# Patient Record
Sex: FEMALE | Race: WHITE | NOT HISPANIC OR LATINO | Employment: OTHER | URBAN - NONMETROPOLITAN AREA
[De-identification: names, ages, dates, MRNs, and addresses within clinical notes are randomized per-mention and may not be internally consistent; named-entity substitution may affect disease eponyms.]

---

## 2017-01-04 ENCOUNTER — OFFICE VISIT - GICH (OUTPATIENT)
Dept: FAMILY MEDICINE | Facility: OTHER | Age: 47
End: 2017-01-04

## 2017-01-04 DIAGNOSIS — B37.2 CANDIDIASIS OF SKIN AND NAIL: ICD-10-CM

## 2017-01-04 DIAGNOSIS — Z00.00 ENCOUNTER FOR GENERAL ADULT MEDICAL EXAMINATION WITHOUT ABNORMAL FINDINGS: ICD-10-CM

## 2017-01-04 DIAGNOSIS — R30.0 DYSURIA: ICD-10-CM

## 2017-01-04 LAB
BILIRUB UR QL: NEGATIVE
CHOL/HDL RATIO - HISTORICAL: 5.64
CHOLESTEROL TOTAL: 282 MG/DL
CLARITY, URINE: CLEAR CLARITY
COLOR UR: YELLOW COLOR
GLUCOSE URINE: NEGATIVE MG/DL
HDLC SERPL-MCNC: 50 MG/DL (ref 23–92)
KETONES UR QL: NEGATIVE MG/DL
LDLC SERPL CALC-MCNC: 206 MG/DL
LEUKOCYTE ESTERASE URINE: NEGATIVE
NITRITE UR QL STRIP: NEGATIVE
NON-HDL CHOLESTEROL - HISTORICAL: 232 MG/DL
OCCULT BLOOD,URINE - HISTORICAL: NEGATIVE
PATIENT STATUS - HISTORICAL: ABNORMAL
PH UR: 7 [PH]
PROTEIN QUALITATIVE,URINE - HISTORICAL: NEGATIVE MG/DL
SP GR UR STRIP: 1.01
TRIGL SERPL-MCNC: 131 MG/DL
UROBILINOGEN,QUALITATIVE - HISTORICAL: NORMAL EU/DL

## 2017-01-04 ASSESSMENT — PATIENT HEALTH QUESTIONNAIRE - PHQ9: SUM OF ALL RESPONSES TO PHQ QUESTIONS 1-9: 1

## 2017-01-17 LAB — HPV RESULTS - HISTORICAL: NEGATIVE

## 2017-01-20 ENCOUNTER — HISTORY (OUTPATIENT)
Dept: FAMILY MEDICINE | Facility: OTHER | Age: 47
End: 2017-01-20

## 2017-01-20 ENCOUNTER — OFFICE VISIT - GICH (OUTPATIENT)
Dept: FAMILY MEDICINE | Facility: OTHER | Age: 47
End: 2017-01-20

## 2017-01-20 DIAGNOSIS — J06.9 ACUTE UPPER RESPIRATORY INFECTION: ICD-10-CM

## 2017-01-20 DIAGNOSIS — B97.89 OTHER VIRAL AGENTS AS THE CAUSE OF DISEASES CLASSIFIED ELSEWHERE: ICD-10-CM

## 2017-01-20 DIAGNOSIS — J02.9 ACUTE PHARYNGITIS: ICD-10-CM

## 2017-01-20 LAB — STREP A ANTIGEN - HISTORICAL: NEGATIVE

## 2017-01-31 ENCOUNTER — HISTORY (OUTPATIENT)
Dept: FAMILY MEDICINE | Facility: OTHER | Age: 47
End: 2017-01-31

## 2017-01-31 ENCOUNTER — OFFICE VISIT - GICH (OUTPATIENT)
Dept: FAMILY MEDICINE | Facility: OTHER | Age: 47
End: 2017-01-31

## 2017-01-31 DIAGNOSIS — J01.00 ACUTE MAXILLARY SINUSITIS: ICD-10-CM

## 2017-02-15 ENCOUNTER — HISTORY (OUTPATIENT)
Dept: FAMILY MEDICINE | Facility: OTHER | Age: 47
End: 2017-02-15

## 2017-02-15 ENCOUNTER — OFFICE VISIT - GICH (OUTPATIENT)
Dept: FAMILY MEDICINE | Facility: OTHER | Age: 47
End: 2017-02-15

## 2017-02-15 DIAGNOSIS — J45.991 COUGH VARIANT ASTHMA: ICD-10-CM

## 2017-02-15 DIAGNOSIS — J01.01 ACUTE RECURRENT MAXILLARY SINUSITIS: ICD-10-CM

## 2017-03-17 ENCOUNTER — HOSPITAL ENCOUNTER (OUTPATIENT)
Dept: RADIOLOGY | Facility: OTHER | Age: 47
End: 2017-03-17
Attending: FAMILY MEDICINE

## 2017-03-17 ENCOUNTER — HISTORY (OUTPATIENT)
Dept: RADIOLOGY | Facility: OTHER | Age: 47
End: 2017-03-17

## 2017-03-17 DIAGNOSIS — Z12.31 ENCOUNTER FOR SCREENING MAMMOGRAM FOR MALIGNANT NEOPLASM OF BREAST: ICD-10-CM

## 2017-05-12 ENCOUNTER — OFFICE VISIT - GICH (OUTPATIENT)
Dept: FAMILY MEDICINE | Facility: OTHER | Age: 47
End: 2017-05-12

## 2017-05-12 ENCOUNTER — HISTORY (OUTPATIENT)
Dept: FAMILY MEDICINE | Facility: OTHER | Age: 47
End: 2017-05-12

## 2017-05-12 ENCOUNTER — AMBULATORY - GICH (OUTPATIENT)
Dept: SCHEDULING | Facility: OTHER | Age: 47
End: 2017-05-12

## 2017-05-12 DIAGNOSIS — Z80.0 FAMILY HISTORY OF MALIGNANT NEOPLASM OF DIGESTIVE ORGAN: ICD-10-CM

## 2017-05-12 DIAGNOSIS — G44.219 EPISODIC TENSION-TYPE HEADACHE, NOT INTRACTABLE: ICD-10-CM

## 2017-05-12 DIAGNOSIS — M54.50 LOW BACK PAIN: ICD-10-CM

## 2017-05-12 DIAGNOSIS — L73.9 FOLLICULAR DISORDER: ICD-10-CM

## 2017-05-12 DIAGNOSIS — G89.29 OTHER CHRONIC PAIN: ICD-10-CM

## 2017-08-12 ENCOUNTER — HISTORY (OUTPATIENT)
Dept: EMERGENCY MEDICINE | Facility: OTHER | Age: 47
End: 2017-08-12

## 2017-09-13 ENCOUNTER — AMBULATORY - GICH (OUTPATIENT)
Dept: FAMILY MEDICINE | Facility: OTHER | Age: 47
End: 2017-09-13

## 2017-12-08 ENCOUNTER — OFFICE VISIT - GICH (OUTPATIENT)
Dept: FAMILY MEDICINE | Facility: OTHER | Age: 47
End: 2017-12-08

## 2017-12-08 ENCOUNTER — HISTORY (OUTPATIENT)
Dept: FAMILY MEDICINE | Facility: OTHER | Age: 47
End: 2017-12-08

## 2017-12-08 DIAGNOSIS — M54.50 LOW BACK PAIN: ICD-10-CM

## 2017-12-08 DIAGNOSIS — G89.29 OTHER CHRONIC PAIN: ICD-10-CM

## 2017-12-08 DIAGNOSIS — G44.219 EPISODIC TENSION-TYPE HEADACHE, NOT INTRACTABLE: ICD-10-CM

## 2018-01-02 NOTE — NURSING NOTE
Patient Information     Patient Name MRN Mary Craft [id number] Female 1970      Nursing Note by Marlyn Daley at 2017 11:00 AM     Author:  Marlyn Daley Service:  (none) Author Type:  (none)     Filed:  2017 11:47 AM Encounter Date:  2017 Status:  Signed     :  Marlyn Daley            Patient here for yearly physical.  Marlyn Daley LPN ..........2017 11:06 AM

## 2018-01-02 NOTE — PROGRESS NOTES
Patient Information     Patient Name MRN Sex Mary Jackson [id number] Female 1970      Progress Notes by Jemima Mendez MD at 2017  5:54 PM     Author:  Jemima Mendez MD Service:  (none) Author Type:  Physician     Filed:  2017  5:54 PM Encounter Date:  2017 Status:  Signed     :  Jemima Mendez MD (Physician)            Notified of results via [employer].

## 2018-01-02 NOTE — PROGRESS NOTES
Patient Information     Patient Name MRN Sex Mary Jackson [id number] Female 1970      Progress Notes by Jemima Mendez MD at 2017 12:32 PM     Author:  Jemima Mendez MD Service:  (none) Author Type:  Physician     Filed:  2017 12:32 PM Encounter Date:  2017 Status:  Signed     :  Jemima Mendez MD (Physician)            Notified of results via [employer].

## 2018-01-02 NOTE — PROGRESS NOTES
Patient Information     Patient Name MRN Sex     Mary Gutierrez [id number] Female 1970      Progress Notes by Jemima Mendez MD at 2017 11:00 AM     Author:  Jemima Mendez MD Service:  (none) Author Type:  Physician     Filed:  2017  6:47 PM Encounter Date:  2017 Status:  Signed     :  Jemima Mendez MD (Physician)            ANNUAL PHYSICAL - FEMALE    HPI: Mary Gutierrez is a 46 y.o. female who presents for a yearly exam.  Concerns include: Upcoming travel to the [facility] [place].    Patient noted 2 days of dysuria and bladder spasms several weeks ago. Symptoms resolved but then recurred shortly after for another 2 days. No gross hematuria. No fevers. Currently she has no symptoms.    No LMP recorded. Patient has had an implant.   Contraception: Mirena IUD  Risk for STI?: no  Last pap:   Any hx of abnormal paps:  no  FH of early CA?: no  Tobacco?: no  Calcium intake: yes  DEXA:  not indicated   Last mammo: 2016  Colonoscopy:  not indicated   Immunizations: Flu shot and hepatitis A shot today.    Patient Active Problem List       Diagnosis  Date Noted     SHOULDER IMPINGEMENT SYNDROME, RIGHT  2011     EPISODIC TENSION TYPE HEADACHE       PRONATION FOOT OR ANKLE, ACQUIRED       Encounter for IUD removal       HYPERCHOLESTEROLEMIA       ASTHMA       allergy induced            Past Medical History      Diagnosis   Date     EARLENE POSITIVE       Discovered during infertility work up        History of delivery        1 para 1-0-0-2 - twin gestation, status post IVF       In vitro fertilization  2004     IUD  05     Mirena IUD placed replaced 2010      Parvovirus B19  2012     confirmed with antibody; complicated by edema, lft increase, bp increase and  brief renal worsening; resolved by          Past Surgical History       Procedure   Laterality Date     Oral surgery        [place] teeth       Invitro        IVF        section         section -  breech/breech twins       Colonoscopy   2013     [place], normal         Social History     Social History        Marital status:       Spouse name: N/A     Number of children:  N/A     Years of education:  N/A     Occupational History      Not on file.     Social History Main Topics         Smoking status:   Never Smoker     Smokeless tobacco:   Never Used     Alcohol use   Yes      Comment: 1-2 glasses of wine in a week      Drug use:   Not on file     Sexual activity:   Not on file     Other Topics  Concern     Not on file      Social History Narrative     She is  with two children. Presently at home with twin daughters.               Family History       Problem   Relation Age of Onset     Other  Maternal Grandmother      Had two spontaneous losses       Cancer-ovarian  Maternal Grandmother 82     Cancer  Maternal Grandfather      Had rectal cancer       Diabetes  Other      Diabetes on maternal side.       Hyperlipidemia  Mother      and CLL dx in 50s       Cancer-colon  Father 60     colon and liver cancer         Current Outpatient Prescriptions       Medication  Sig Dispense Refill     calcium once daily.       cholecalciferol (VITAMIN D) 1,000 unit capsule Take 1 capsule by mouth once daily.  0     cyclobenzaprine (FLEXERIL) 10 mg tablet Take 1 tablet by mouth 3 times daily. 90 tablet 3     HYDROcodone-acetaminophen, 5-325 mg, (NORCO) per tablet Take 1-2 tablets by mouth every 6 hours if needed for Pain. Max acetaminophen dose: 4000 mg in 24 hrs. 60 tablet 0     Lactobacillus acidophilus (PROBIOTIC) 10 billion cell cap Take  by mouth.  0     multivitamin (MVI) tablet Take 1 tablet by mouth once daily.       omega-3 fatty acids-vitamin E (FISH OIL) 1,000 mg cap Take  by mouth once daily.       No current facility-administered medications for this visit.      Medications have been reviewed by me and are current to the best of my knowledge and ability.       REVIEW OF SYSTEMS:  15 system ROS  "completed and negative other than: See HPI.    PHYSICAL EXAM:  Visit Vitals       /76     Pulse 60     Ht 1.613 m (5' 3.5\")     Wt 61.1 kg (134 lb 9.6 oz)     BMI 23.47 kg/m2     CONSTITUTIONAL:  Alert, cooperative, NAD.  EYES: No scleral icterus.  PERRLA.  Conjunctiva clear.  ENT/MOUTH: External ears and nose normal.  TMs normal.  Moist mucous membranes. Oropharynx clear.    ENDO: No thyromegaly or thyroid nodules.  LYMPH:  No cervical or supraclavicular LA.    BREASTS: No skin abnormalities, no erythema.  No discrete masses.  No nipple discharge, no axillary, supra- or infraclavicular LA.   CARDIOVASCULAR: Regular, S1, S2.  No S3 or S4.  No murmur/gallop/rub.  No peripheral edema.  RESPIRATORY: CTA bilaterally, no wheezes, rhonchi or rales.  GI: Bowel sounds wnl.  Soft, nontender, nondistended.  No masses or HSM.  No rebound or guarding.  : Vulva: normal, no lesions or discharge  Urethral meatus: normal size and location, no lesions or discharge  Urethra: no tenderness or masses  Bladder: no fullness or tenderness  Vagina: normal appearance, no abnormal discharge, no lesions.  No evidence of cystocele or rectocele. Rash on left side of vulva red with sharp demarcation of the outer aspect, evidence of excoriation.  Cervix: normal appearance, no lesions, no abnormal discharge, no cervical motion tenderness  Uterus: normal size and position, mobile, non-tender  Pap smear obtained: yes  Adnexa: no palpable masses bilaterally  MSKEL: Grossly normal ROM.  No clubbing.  INTEGUMENTARY:  Warm, dry.  No rash noted on exposed skin.  NEUROLOGIC: Facies symmetric.  Grossly normal movement and tone.  No tremor.  PSYCHIATRIC: Affect normal.  Speech fluent.  Though content linear.     ASSESSMENT/PLAN:    ICD-10-CM    1. Health care maintenance Z00.00 GYN THIN PREP PAP SCREEN IMAGED      LIPID PANEL      FLU VACCINE => 3 PRESERV FREE QUADRIVALENT IIV4 IM      HEP A VACCINE ADULT IM      LIPID PANEL      GYN THIN PREP PAP " SCREEN IMAGED      NV ADMIN VACC INITIAL      NV ADMIN EA ADDL VACC   2. Dysuria R30.0 URINALYSIS W REFLEX MICROSCOPIC IF POSITIVE      URINALYSIS W REFLEX MICROSCOPIC IF POSITIVE      URINALYSIS W REFLEX MICROSCOPIC IF POSITIVE   3. Yeast dermatitis B37.2 fluconazole (DIFLUCAN) 150 mg tablet       UA today. If negative will place an order for repeat UA when symptoms recur. Discussed possibility of a stone causing symptoms. Would recommend continued monitoring.  Hep A today with repeat in 6 months.  Reviewed CDC recommendations for travel to [place] and recommend review of the information available to prevent traveler's diarrhea.  Discussed probable yeast infection of the vulva, would recommend treatment with Diflucan consider topical antifungal.  Due to previous elevated lipid profile cardiac risk factor calculator is reviewed with the patient. Her risk of a heart attack in the next 10 years is 1.4% which would not indicate a need for high-dose statin therapy. Repeat fasting lipid profile today. Discussed lifestyle changes that may help reduce cholesterol.  Relevant cancer screening discussed.    Counseled on healthy diet, Calcium and vitamin D intake, and exercise.    Jemima Mendez MD

## 2018-01-03 NOTE — PROGRESS NOTES
Patient Information     Patient Name MRN Sex Mary Jackson [id number] Female 1970      Progress Notes by Pau Kent DO at 2017 11:30 AM     Author:  Pau Kent DO Service:  (none) Author Type:  PHYS- Osteopathic     Filed:  2017  1:44 PM Encounter Date:  2017 Status:  Signed     :  Pau Kent DO (PHYS- Osteopathic)            SUBJECTIVE:    Mary Gutierrez is a 46 y.o. female who presents for ill x 2 weeks.    HPI  Mary is here for concerns of illness ×2 weeks. She was initially seen a week and a half ago for concerns of strep throat, however tested negative.  Since that time she feels like the illness has now gotten into her chest, she is coughing more, has strained back muscles due to her cough. She also has significant tenderness in her left ear. Feels feverish and chilled but does not believe she had an objective fever.  She has tried using essential oils, vitamin C, Echinacea. She has also tried more conventional means of Mucinex and nasal saline.    Allergies     Allergen  Reactions     Sulfa (Sulfonamide Antibiotics) Nausea Only   ,   Current Outpatient Prescriptions on File Prior to Visit       Medication  Sig Dispense Refill     calcium once daily.       cholecalciferol (VITAMIN D) 1,000 unit capsule Take 1 capsule by mouth once daily.  0     cyclobenzaprine (FLEXERIL) 10 mg tablet Take 1 tablet by mouth 3 times daily. 90 tablet 3     fluconazole (DIFLUCAN) 150 mg tablet Take 1 tablet by mouth once daily. 3 tablet 0     HYDROcodone-acetaminophen, 5-325 mg, (NORCO) per tablet Take 1-2 tablets by mouth every 6 hours if needed for Pain. Max acetaminophen dose: 4000 mg in 24 hrs. 60 tablet 0     Lactobacillus acidophilus (PROBIOTIC) 10 billion cell cap Take  by mouth.  0     multivitamin (MVI) tablet Take 1 tablet by mouth once daily.       omega-3 fatty acids-vitamin E (FISH OIL) 1,000 mg cap Take  by mouth once daily.       No current  facility-administered medications on file prior to visit.     and   Past Medical History      Diagnosis   Date     EARLENE POSITIVE       Discovered during infertility work up        History of delivery        1 para 1-0-0-2 - twin gestation, status post IVF       In vitro fertilization       IUD  05     Mirena IUD placed replaced 2010      Parvovirus B19  2012     confirmed with antibody; complicated by edema, lft increase, bp increase and  brief renal worsening; resolved by          REVIEW OF SYSTEMS:  Review of Systems   Respiratory: Positive for sputum production. Negative for hemoptysis, shortness of breath and wheezing.    Cardiovascular: Negative for chest pain and palpitations.   Gastrointestinal: Negative for nausea and vomiting.       OBJECTIVE:  /80  Pulse 100  Temp 97.1  F (36.2  C) (Tympanic)  Wt 61.6 kg (135 lb 12.8 oz)  BMI 23.6 kg/m2    EXAM:   Physical Exam   Constitutional: She is well-developed, well-nourished, and in no distress.   HENT:   Head: Normocephalic and atraumatic.   Right Ear: External ear and ear canal normal. A middle ear effusion is present.   Left Ear: External ear normal. Tympanic membrane is erythematous and bulging. A middle ear effusion is present.   Nose: Mucosal edema and rhinorrhea present. Right sinus exhibits maxillary sinus tenderness. Right sinus exhibits no frontal sinus tenderness. Left sinus exhibits no maxillary sinus tenderness and no frontal sinus tenderness.   Mouth/Throat: Uvula is midline and oropharynx is clear and moist. No oropharyngeal exudate.   Cardiovascular: Normal rate, normal heart sounds and intact distal pulses.    No murmur heard.  Pulmonary/Chest: Effort normal and breath sounds normal.   Psychiatric: Mood and affect normal.   Nursing note and vitals reviewed.      ASSESSMENT/PLAN:    ICD-10-CM    1. Subacute maxillary sinusitis J01.00 cefdinir (OMNICEF) 300 mg capsule        Plan:   Sinusitis, persistent.  Treat with  14 days of omnicef BID.  Encouraged probiotic/yogurt while on medication.  Discussed other symptomatic treatment. Recommend NSAIDs, decongestent and saline irrigation. Follow-up if worsening sx or no improvement in the next 7-10 days.

## 2018-01-03 NOTE — NURSING NOTE
Patient Information     Patient Name MRN Mary Craft [id number] Female 1970      Nursing Note by Xiomara Solitario at 2/15/2017 10:30 AM     Author:  Xiomara Solitario Service:  (none) Author Type:  (none)     Filed:  2/15/2017 10:49 AM Encounter Date:  2/15/2017 Status:  Signed     :  Xiomara Solitario            She has had a cold for 4 weeks. She has had a cough for 3 weeks. It was productive to start out with but now it is harsh and she is coughing harder. Her left ear was plugged but is better now. She was treated for a sinus infection 2.5 weeks ago and that is better.  Xiomara Soiltario LPN..................2/15/2017   10:45 AM

## 2018-01-03 NOTE — PROGRESS NOTES
Patient Information     Patient Name MRN Sex Mary Jackson [id number] Female 1970      Progress Notes by Jennifer Huffman PA-C at 2017  8:30 AM     Author:  Jennifer Huffman PA-C Service:  (none) Author Type:  PHYS- Physician Assistant     Filed:  2017  9:38 AM Encounter Date:  2017 Status:  Signed     :  Jennifer Huffman PA-C (PHYS- Physician Assistant)            Nursing Notes:   Matt Nevarez  2017  8:53 AM  Signed  Pt here today for a sore throat that started yesterday, sinuses are draining into the back of her throat, pt stated its pain full to swallow.  Matt Nevarez LPN .............2017  8:36 AM      HPI:    Mary Gutierrez is a 46 y.o. female who presents for sore throat that started yesterday. Sinuses are draining into the back of her throat, pt stated its pain full to swallow. Strep exposure with school. No fevers, cough, GI symptoms. No ear pain. LNs swollen.  PND.     Past Medical History      Diagnosis   Date     EARLENE POSITIVE       Discovered during infertility work up        History of delivery        1 para 1-0-0-2 - twin gestation, status post IVF       In vitro fertilization       IUD  05     Mirena IUD placed replaced 2010      Parvovirus B19  2012     confirmed with antibody; complicated by edema, lft increase, bp increase and  brief renal worsening; resolved by          Past Surgical History       Procedure   Laterality Date     Oral surgery        [place] teeth       Invitro        IVF        section         section - breech/breech twins       Colonoscopy        [place], normal         Social History       Substance Use Topics         Smoking status:   Never Smoker     Smokeless tobacco:   Never Used     Alcohol use   Yes      Comment: 1-2 glasses of wine in a week        Current Outpatient Prescriptions       Medication  Sig Dispense Refill     calcium once daily.       cholecalciferol (VITAMIN D)  "1,000 unit capsule Take 1 capsule by mouth once daily.  0     cyclobenzaprine (FLEXERIL) 10 mg tablet Take 1 tablet by mouth 3 times daily. 90 tablet 3     fluconazole (DIFLUCAN) 150 mg tablet Take 1 tablet by mouth once daily. 3 tablet 0     HYDROcodone-acetaminophen, 5-325 mg, (NORCO) per tablet Take 1-2 tablets by mouth every 6 hours if needed for Pain. Max acetaminophen dose: 4000 mg in 24 hrs. 60 tablet 0     Lactobacillus acidophilus (PROBIOTIC) 10 billion cell cap Take  by mouth.  0     multivitamin (MVI) tablet Take 1 tablet by mouth once daily.       omega-3 fatty acids-vitamin E (FISH OIL) 1,000 mg cap Take  by mouth once daily.       No current facility-administered medications for this visit.      Medications have been reviewed by me and are current to the best of my knowledge and ability.      Allergies     Allergen  Reactions     Sulfa (Sulfonamide Antibiotics) Nausea Only       REVIEW OF SYSTEMS:  Refer to HPI.    EXAM:   Vitals:    /78  Pulse (!) 112  Temp 98.4  F (36.9  C) (Tympanic)  Ht 1.615 m (5' 3.6\")  Wt 60.6 kg (133 lb 9.6 oz)  BMI 23.22 kg/m2    General Appearance: Pleasant, alert, appropriate appearance for age. No acute distress  Ear Exam: Normal TM's bilaterally, grey, translucent, bony landmarks appreciated.   Left/Right TM: Effusion is not present. TM is not bulging. There is no pus appreciated.    Normal auditory canals and external ears. Non-tender.   OroPharynx Exam:  Erythematous posterior pharynx with no exudates. No sinus pain upon palpation of frontal, ethmoid, and maxillary sinuses.  Chest/Respiratory Exam: Normal chest wall and respirations. Clear to auscultation. No retractions appreciated.  Cardiovascular Exam: Regular rate and rhythm. S1, S2, no murmur, click, gallop, or rubs.  Lymphatic Exam: ACLN.  Skin: no rash or abnormalities  Psychiatric Exam: Alert and oriented - appropriate affect.    PHQ Depression Screen  Date of PHQ exam: 01/20/17  Over the last 2 weeks, how " often have you been bothered by any of the following problems?  1. Little interest or pleasure in doing things: 0 - Not at all  2. Feeling down, depressed, or hopeless: 0 - Not at all      LABS:    Results for orders placed or performed in visit on 01/20/17       THROAT RAPID STREP A WITH REFLEX       Result  Value Ref Range Status    STREP A ANTIGEN           Negative Negative Final       ASSESSMENT AND PLAN:      ICD-10-CM    1. Viral URI J06.9      B97.89    2. Sore throat J02.9 THROAT RAPID STREP A WITH REFLEX      THROAT RAPID STREP A WITH REFLEX       Negative strep. No antibiotics warranted at this time.     Symptoms due to virus.     Patient Instructions   Negative strep. No antibiotics warranted at this time.     Symptoms due to virus. No antibiotic is needed at this time. Symptoms typically worse on days 3-4 and then begin improving each day. If symptoms begin worsening or fail to improve after 10 days, return to clinic for reevaluation.     May use symptomatic care with tylenol or ibuprofen. Sudafed or mucinex work well for congestion. May use cough syrup or cough drops.  Using a humidifier works well to break up the congestion. You can also sleep propped up on a couple pillows to decrease symptoms at night.    Please take tylenol as needed up to 4 times daily.  Treat symptomatically with warm salt water gargles.  Lozenges, Tylenol, Advil or Aleve as needed. Frequent swallows of cool liquid.  Oatmeal coats the throat and some patients find it soothes the pain. Encouraged warm teas or fluids with honey.     If you have sinus congestion -  Use a Neti Pot/sinus flush ([employer] Med Sinus Rinse) 3 times daily to irrigate sinuses/mucosal tissue.     Monitor for any fevers or chills. Return in 7-10 days if not feeling better. Please call clinic with any questions or concerns. Return to clinic with change/worsening of symptoms.   Encouraged fluids and rest.    Call 9-1-1 or go to the emergency room if you:  Have  trouble breathing   Are drooling because you cannot swallow your saliva   Have swelling of the neck or tongue   Cannot move your neck or have trouble opening your mouth          Jennifer Huffman PA-C..................1/20/2017 8:50 AM

## 2018-01-03 NOTE — PATIENT INSTRUCTIONS
Patient Information     Patient Name MRN Mary Craft [id number] Female 1970      Patient Instructions by Jennifer Huffman PA-C at 2017  8:30 AM     Author:  Jennifer Huffman PA-C Service:  (none) Author Type:  PHYS- Physician Assistant     Filed:  2017  8:56 AM Encounter Date:  2017 Status:  Signed     :  Jennifer Huffman PA-C (PHYS- Physician Assistant)            Negative strep. No antibiotics warranted at this time.     Symptoms due to virus. No antibiotic is needed at this time. Symptoms typically worse on days 3-4 and then begin improving each day. If symptoms begin worsening or fail to improve after 10 days, return to clinic for reevaluation.     May use symptomatic care with tylenol or ibuprofen. Sudafed or mucinex work well for congestion. May use cough syrup or cough drops.  Using a humidifier works well to break up the congestion. You can also sleep propped up on a couple pillows to decrease symptoms at night.    Please take tylenol as needed up to 4 times daily.  Treat symptomatically with warm salt water gargles.  Lozenges, Tylenol, Advil or Aleve as needed. Frequent swallows of cool liquid.  Oatmeal coats the throat and some patients find it soothes the pain. Encouraged warm teas or fluids with honey.     If you have sinus congestion -  Use a Neti Pot/sinus flush (Hue[employer] Med Sinus Rinse) 3 times daily to irrigate sinuses/mucosal tissue.     Monitor for any fevers or chills. Return in 7-10 days if not feeling better. Please call clinic with any questions or concerns. Return to clinic with change/worsening of symptoms.   Encouraged fluids and rest.    Call  or go to the emergency room if you:  Have trouble breathing   Are drooling because you cannot swallow your saliva   Have swelling of the neck or tongue   Cannot move your neck or have trouble opening your mouth

## 2018-01-03 NOTE — NURSING NOTE
Patient Information     Patient Name MRN Mary Craft [id number] Female 1970      Nursing Note by Matt Nevarez at 2017  8:30 AM     Author:  Matt Nevarez Service:  (none) Author Type:  (none)     Filed:  2017  8:53 AM Encounter Date:  2017 Status:  Signed     :  Matt Nevarez            Pt here today for a sore throat that started yesterday, sinuses are draining into the back of her throat, pt stated its pain full to swallow.  Matt Nevarez LPN .............2017  8:36 AM

## 2018-01-03 NOTE — PROGRESS NOTES
Patient Information     Patient Name MRN Sex Mary Jackson [id number] Female 1970      Progress Notes by Jim Julio MD at 2/15/2017 10:30 AM     Author:  Jim Julio MD Service:  (none) Author Type:  Physician     Filed:  2/15/2017 12:48 PM Encounter Date:  2/15/2017 Status:  Signed     :  Jim Julio MD (Physician)            Nursing Notes:   Xiomara Solitario  2/15/2017 10:49 AM  Signed  She has had a cold for 4 weeks. She has had a cough for 3 weeks. It was productive to start out with but now it is harsh and she is coughing harder. Her left ear was plugged but is better now. She was treated for a sinus infection 2.5 weeks ago and that is better.  Xiomara Solitario LPN..................2/15/2017   10:45 AM    SUBJECTIVE:  Mary Gutierrez  is a 46 y.o. female who comes in today for persistent cough. She was treated a few weeks ago for sinus infection and that is better but now his cough has been persistent. It's gone on for at least 3 weeks.  She has had to use an inhaler in the past.  She will get lightheaded with coughing. It feels like a tickle. She is doing nasal rinses some, and she is getting clear stuff out.  No fever.     Past Medical, Family, and Social History reviewed and updated as noted below.   ROS is negative except as noted above       Allergies     Allergen  Reactions     Sulfa (Sulfonamide Antibiotics) Nausea Only   ,   Family History       Problem   Relation Age of Onset     Other  Maternal Grandmother      Had two spontaneous losses       Cancer-ovarian  Maternal Grandmother 82     Cancer  Maternal Grandfather      Had rectal cancer       Diabetes  Other      Diabetes on maternal side.       Hyperlipidemia  Mother      and CLL dx in 50s       Cancer-colon  Father 60     colon and liver cancer     ,   Current Outpatient Prescriptions on File Prior to Visit       Medication  Sig Dispense Refill     calcium once daily.       cholecalciferol (VITAMIN D) 1,000 unit  capsule Take 1 capsule by mouth once daily.  0     cyclobenzaprine (FLEXERIL) 10 mg tablet Take 1 tablet by mouth 3 times daily. 90 tablet 3     fluconazole (DIFLUCAN) 150 mg tablet Take 1 tablet by mouth once daily. 3 tablet 0     HYDROcodone-acetaminophen, 5-325 mg, (NORCO) per tablet Take 1-2 tablets by mouth every 6 hours if needed for Pain. Max acetaminophen dose: 4000 mg in 24 hrs. 60 tablet 0     Lactobacillus acidophilus (PROBIOTIC) 10 billion cell cap Take  by mouth.  0     multivitamin (MVI) tablet Take 1 tablet by mouth once daily.       omega-3 fatty acids-vitamin E (FISH OIL) 1,000 mg cap Take  by mouth once daily.       No current facility-administered medications on file prior to visit.    ,   Past Medical History      Diagnosis   Date     EARLENE POSITIVE       Discovered during infertility work up        History of delivery        1 para 1-0-0-2 - twin gestation, status post IVF       In vitro fertilization       IUD  05     Mirena IUD placed replaced 2010      Parvovirus B19  2012     confirmed with antibody; complicated by edema, lft increase, bp increase and  brief renal worsening; resolved by      ,   Patient Active Problem List       Diagnosis  Date Noted     SHOULDER IMPINGEMENT SYNDROME, RIGHT  2011     EPISODIC TENSION TYPE HEADACHE       PRONATION FOOT OR ANKLE, ACQUIRED       Encounter for IUD removal       HYPERCHOLESTEROLEMIA       ASTHMA       allergy induced        ,   Past Surgical History       Procedure   Laterality Date     Oral surgery        [place] teeth       Invitro        IVF        section         section - breech/breech twins       Colonoscopy        [place], normal      and   Social History       Substance Use Topics         Smoking status:   Never Smoker     Smokeless tobacco:   Never Used     Alcohol use   Yes      Comment: 1-2 glasses of wine in a week      OBJECTIVE:  Visit Vitals       BP (!) 146/102     Pulse 80     Temp  "97.8  F (36.6  C) (Tympanic)     Resp 16     Ht 1.613 m (5' 3.5\")     Wt 59.4 kg (131 lb)     Breastfeeding No     BMI 22.84 kg/m2      EXAM:  Healthy-appearing lady, no distress. Nose is mildly congested. Sinuses are minimally tender to palpation and transiently poorly. TMs appear clear. Throat is clear. Neck is supple without significant adenopathy. Blood pressure is up to 2 over-the-counter decongest since. Lungs are clear, no rales rhonchi or wheezes are heard except on forced end expiration, she has coarse breath sounds and cough. Cardiac RRR without murmur.  ASSESSMENT/Plan :      Mary was seen today for cough.    Diagnoses and all orders for this visit:    Acute recurrent maxillary sinusitis  -     azithromycin (ZITHROMAX) 250 mg tablet; Take 500 mg (2 tabs) by mouth on day 1, then 250 mg (1 tab) daily for days 2-5.    Cough variant asthma  -     predniSONE (DELTASONE) 20 mg tablet; 3 tablets daily for 3 days, 2 tablets daily for 3 days, 1 tablet daily for 3 days then stop.  Take with food.  -     albuterol HFA (VENTOLIN HFA) 90 mcg/actuation inhaler; Inhale 2 Puffs by mouth every 4 hours if needed.  -     MDI INSTRUCT; Future      She'll continue with nasal irrigation. Placed on Zithromax and instructed on use. I believe that she has a postinfectious cough/cough variant asthma so will burst of prednisone. She is given a prescription for albuterol inhaler and instructed on use. Discussed side effects as well. Follow-up if worsening or not improving.    Jim Julio MD            "

## 2018-01-03 NOTE — ADDENDUM NOTE
Patient Information     Patient Name MRN Mary Craft [id number] Female 1970      Addendum Note by Emma Banks at 2017  2:15 PM     Author:  Emma Banks Service:  (none) Author Type:  (none)     Filed:  2017  2:15 PM Encounter Date:  2017 Status:  Signed     :  Emma Banks       Addended by: EMMA BANKS on: 2017 02:15 PM        Modules accepted: Orders

## 2018-01-03 NOTE — PROGRESS NOTES
Patient Information     Patient Name MRN Sex Mary Jackson [id number] Female 1970      Progress Notes by Odalys Fraser at 3/17/2017  8:32 AM     Author:  Odalys Fraser Service:  (none) Author Type:  (none)     Filed:  3/17/2017  8:32 AM Date of Service:  3/17/2017  8:32 AM Status:  Signed     :  Odalys Fraser            Falls Risk Criteria:    Age 65 and older or under age 4        Sensory deficits    Poor vision    Use of ambulatory aides    Impaired judgment    Unable to walk independently    Meets High Risk criteria for falls:  no

## 2018-01-05 NOTE — PROGRESS NOTES
Patient Information     Patient Name MRN Sex Mary Jackson [id number] Female 1970      Progress Notes by Jemima Mendez MD at 2017  8:45 AM     Author:  Jemima Mendez MD Service:  (none) Author Type:  Physician     Filed:  2017  9:24 AM Encounter Date:  2017 Status:  Signed     :  Jemima Mendez MD (Physician)            SUBJECTIVE:    Mary Gutierrez is a 47 y.o. female who presents for medication management.     HPI Comments: Patient has Norco available as needed for chronic tension-type headaches. Her headaches have improved significantly over the last year and she has paid more attention to her nutrition. She finds that avoiding sugar and other carbs seems to help reduce frequency of her headaches.  Mary was given 3 prescriptions last July, #60 each. She has not had refills since.  Mary reports that her dad had colon cancer diagnosed at age 60. Mary had a screening colonoscopy at age 42. This was normal. She is wondering about timing of follow-up colonoscopy.  Mary recently noted redness inflammation and purulent drainage from her  scar. Her  was 11 years ago. This has since improved but she would like to have this evaluated.    REVIEW OF SYSTEMS:  ROS see history of present illness, review of systems is otherwise negative.    OBJECTIVE:  /68  Pulse 64  Wt 61.6 kg (135 lb 12.8 oz)  BMI 23.68 kg/m2    EXAM:   Physical Exam   Constitutional: She is oriented to person, place, and time and well-developed, well-nourished, and in no distress.   Eyes: Conjunctivae are normal.   Cardiovascular: Normal rate.    Pulmonary/Chest: Effort normal.   Neurological: She is alert and oriented to person, place, and time.   Skin: No rash noted.   Hypertrophic scar tissue in the area of her previous  incision. This is more prominent on the left side. No current induration no drainage. Scaling noted on the surface on the left side of the incision.   Psychiatric:  Mood and affect normal.       ASSESSMENT/PLAN:    ICD-10-CM    1. Episodic tension-type headache, not intractable G44.219 cyclobenzaprine (FLEXERIL) 10 mg tablet   2. Chronic bilateral low back pain, with sciatica presence unspecified M54.5 HYDROcodone-acetaminophen, 5-325 mg, (NORCO) per tablet     G89.29 DISCONTINUED: HYDROcodone-acetaminophen, 5-325 mg, (NORCO) per tablet      DISCONTINUED: HYDROcodone-acetaminophen, 5-325 mg, (NORCO) per tablet   3. Family history of colon cancer Z80.0    4. Folliculitis L73.9         Plan:  3 prescriptions filled for #60 of Norco.  database printed, reviewed and signed. Will check with Dr. Fair on timing of next colonoscopy. No further treatment needed for folliculitis/scar infection.  Discussed ways to prevent further issues, treatment to consider. Call for an antibiotics if symptoms worsen or progress.       Jemima Mendez MD

## 2018-01-05 NOTE — NURSING NOTE
Patient Information     Patient Name MRN Mary Craft [id number] Female 1970      Nursing Note by Marlyn Daley at 2017  8:45 AM     Author:  Marlyn Daley Service:  (none) Author Type:  (none)     Filed:  2017  9:07 AM Encounter Date:  2017 Status:  Signed     :  Marlyn Daley            Patient here for medication management.   Marlyn Daley LPN ..........2017 8:56 AM

## 2018-01-26 VITALS
SYSTOLIC BLOOD PRESSURE: 116 MMHG | WEIGHT: 135.8 LBS | DIASTOLIC BLOOD PRESSURE: 68 MMHG | BODY MASS INDEX: 23.68 KG/M2 | HEART RATE: 64 BPM

## 2018-01-26 VITALS
BODY MASS INDEX: 22.81 KG/M2 | HEART RATE: 112 BPM | SYSTOLIC BLOOD PRESSURE: 132 MMHG | WEIGHT: 133.6 LBS | HEIGHT: 64 IN | DIASTOLIC BLOOD PRESSURE: 78 MMHG | TEMPERATURE: 98.4 F

## 2018-01-26 VITALS
HEIGHT: 64 IN | BODY MASS INDEX: 22.98 KG/M2 | DIASTOLIC BLOOD PRESSURE: 76 MMHG | HEART RATE: 60 BPM | SYSTOLIC BLOOD PRESSURE: 128 MMHG | WEIGHT: 134.6 LBS

## 2018-01-26 VITALS
WEIGHT: 135.8 LBS | WEIGHT: 131 LBS | TEMPERATURE: 97.1 F | HEART RATE: 80 BPM | HEIGHT: 64 IN | DIASTOLIC BLOOD PRESSURE: 102 MMHG | SYSTOLIC BLOOD PRESSURE: 146 MMHG | TEMPERATURE: 97.8 F | BODY MASS INDEX: 22.36 KG/M2 | RESPIRATION RATE: 16 BRPM | DIASTOLIC BLOOD PRESSURE: 80 MMHG | HEART RATE: 100 BPM | SYSTOLIC BLOOD PRESSURE: 132 MMHG

## 2018-01-27 ASSESSMENT — PATIENT HEALTH QUESTIONNAIRE - PHQ9: SUM OF ALL RESPONSES TO PHQ QUESTIONS 1-9: 1

## 2018-02-01 ENCOUNTER — DOCUMENTATION ONLY (OUTPATIENT)
Dept: FAMILY MEDICINE | Facility: OTHER | Age: 48
End: 2018-02-01

## 2018-02-01 PROBLEM — G44.219 EPISODIC TENSION TYPE HEADACHE: Status: ACTIVE | Noted: 2018-02-01

## 2018-02-01 PROBLEM — E78.00 HYPERCHOLESTEROLEMIA: Status: ACTIVE | Noted: 2018-02-01

## 2018-02-01 PROBLEM — J45.909 ASTHMA: Status: ACTIVE | Noted: 2018-02-01

## 2018-02-01 PROBLEM — Z30.432 ENCOUNTER FOR IUD REMOVAL: Status: ACTIVE | Noted: 2018-02-01

## 2018-02-01 PROBLEM — M21.969 ACQUIRED DEFORMITY OF ANKLE AND FOOT: Status: ACTIVE | Noted: 2018-02-01

## 2018-02-01 RX ORDER — CYCLOBENZAPRINE HCL 10 MG
10 TABLET ORAL 3 TIMES DAILY
COMMUNITY
Start: 2017-05-12 | End: 2018-05-23

## 2018-02-01 RX ORDER — HYDROCODONE BITARTRATE AND ACETAMINOPHEN 5; 325 MG/1; MG/1
1-2 TABLET ORAL EVERY 6 HOURS PRN
COMMUNITY
Start: 2017-12-08 | End: 2018-04-25

## 2018-02-01 RX ORDER — DIPHENOXYLATE HYDROCHLORIDE AND ATROPINE SULFATE 2.5; .025 MG/1; MG/1
1 TABLET ORAL DAILY
COMMUNITY
End: 2018-11-02

## 2018-02-01 RX ORDER — CHLORAL HYDRATE 500 MG
CAPSULE ORAL DAILY
COMMUNITY
End: 2022-06-28

## 2018-02-01 RX ORDER — ORPHENADRINE CITRATE 100 MG/1
100 TABLET, EXTENDED RELEASE ORAL 2 TIMES DAILY PRN
COMMUNITY
Start: 2017-08-12 | End: 2018-04-21

## 2018-02-09 VITALS
SYSTOLIC BLOOD PRESSURE: 128 MMHG | DIASTOLIC BLOOD PRESSURE: 66 MMHG | BODY MASS INDEX: 24.52 KG/M2 | HEART RATE: 68 BPM | WEIGHT: 140.6 LBS

## 2018-02-12 NOTE — NURSING NOTE
Patient Information     Patient Name MRN Mary Craft [id number] Female 1970      Nursing Note by Marlyn Daley at 2017  1:45 PM     Author:  Marlyn Daley Service:  (none) Author Type:  (none)     Filed:  2017  1:54 PM Encounter Date:  2017 Status:  Signed     :  Marlyn Daley            Patient here for headaches, lump behind left ear, and medication check.  Marlyn Daley LPN ..........2017 1:44 PM

## 2018-02-12 NOTE — PROGRESS NOTES
Patient Information     Patient Name MRN Sex Mary Jackson [id number] Female 1970      Progress Notes by Jemima Mendez MD at 2017  1:45 PM     Author:  Jemima Mendez MD Service:  (none) Author Type:  Physician     Filed:  2017  2:11 PM Encounter Date:  2017 Status:  Signed     :  Jemima Mendez MD (Physician)            SUBJECTIVE:    Mary Gutierrez is a 47 y.o. female who presents for medication refills.    HPI Comments: Patient presents for refills of pain medications. She uses Norco intermittently as needed for tension type headaches. This summer she had an acute exacerbation of severe muscle spasms causing a headache and emesis. This happened after a day spent cleaning and cooking. She ended up presenting to the emergency room for symptom control. Prior to that she felt like she had very good control of her headaches. It did take about 2 weeks for her to feel like she was back on track. Since then she has not been as good as previous. She is trying to get back on track with her diet which does seem to help with her headaches. Mary did start acupuncture last week.    She is a lump behind her left earlobe that she would like evaluated. She has a lump in the area of her right axilla. Both of these have been present for proximally one month.      Patient Active Problem List       Diagnosis  Date Noted     SHOULDER IMPINGEMENT SYNDROME, RIGHT  2011     EPISODIC TENSION TYPE HEADACHE       PRONATION FOOT OR ANKLE, ACQUIRED       Encounter for IUD removal       HYPERCHOLESTEROLEMIA       ASTHMA       allergy induced            REVIEW OF SYSTEMS:  ROS see history of present illness, review of systems otherwise negative.    OBJECTIVE:  /66  Pulse 68  Wt 63.8 kg (140 lb 9.6 oz)  BMI 24.91 kg/m2    EXAM:   Physical Exam   Constitutional: She is oriented to person, place, and time and well-developed, well-nourished, and in no distress.   HENT:   TMs normal.   Eyes:  Conjunctivae are normal.   Cardiovascular: Normal rate.    Pulmonary/Chest: Effort normal.   Neurological: She is alert and oriented to person, place, and time.   Skin: No rash noted.   Patient has a 2 mm raised flesh-colored papule behind her left earlobe.  There is a 1 cm x 4 cm oblong lesion in the subcutaneous tissue of the right axilla, no fluctuance noted. No redness or drainage.   Psychiatric: Mood and affect normal.       ASSESSMENT/PLAN:    ICD-10-CM    1. Episodic tension-type headache, not intractable G44.219 AMB CONSULT TO PHYSICAL THERAPY   2. Chronic bilateral low back pain, with sciatica presence unspecified M54.5 HYDROcodone-acetaminophen, 5-325 mg, (NORCO) per tablet     G89.29 HYDROcodone-acetaminophen, 5-325 mg, (NORCO) per tablet      DISCONTINUED: HYDROcodone-acetaminophen, 5-325 mg, (NORCO) per tablet        Plan:  Pain meds refilled. [place] 5-3 25, 3 prescriptions provided. Typically this lasts longer than 3 months. Patient will come back when needing refills. [facility] database is reviewed and signed. Patient is referred to physical therapy for additional assistance in managing her tension-type headaches. He with acupuncture.    Patient is reassured that skin lesion behind her ear appears benign. She is asked to rely me to look at this on occasion in case it's appearance changes. Skin lesion in the right axilla is not consistent with a lymph node. It is present more superficially. Suspect it probably started with a folliculitis, possibly a sebaceous cyst. Would recommend monitoring for now. If it increases in size may need further evaluation and/or imaging.    Jemima Mendez MD

## 2018-02-24 ENCOUNTER — HEALTH MAINTENANCE LETTER (OUTPATIENT)
Age: 48
End: 2018-02-24

## 2018-04-21 ENCOUNTER — OFFICE VISIT (OUTPATIENT)
Dept: FAMILY MEDICINE | Facility: OTHER | Age: 48
End: 2018-04-21
Attending: NURSE PRACTITIONER
Payer: COMMERCIAL

## 2018-04-21 VITALS
DIASTOLIC BLOOD PRESSURE: 74 MMHG | OXYGEN SATURATION: 99 % | BODY MASS INDEX: 25 KG/M2 | TEMPERATURE: 99.2 F | RESPIRATION RATE: 16 BRPM | WEIGHT: 143.4 LBS | HEART RATE: 114 BPM | SYSTOLIC BLOOD PRESSURE: 108 MMHG

## 2018-04-21 DIAGNOSIS — H65.91 OME (OTITIS MEDIA WITH EFFUSION), RIGHT: ICD-10-CM

## 2018-04-21 DIAGNOSIS — J01.00 ACUTE MAXILLARY SINUSITIS, RECURRENCE NOT SPECIFIED: Primary | ICD-10-CM

## 2018-04-21 DIAGNOSIS — J02.9 ACUTE PHARYNGITIS, UNSPECIFIED ETIOLOGY: ICD-10-CM

## 2018-04-21 PROCEDURE — 99213 OFFICE O/P EST LOW 20 MIN: CPT | Performed by: NURSE PRACTITIONER

## 2018-04-21 RX ORDER — AMOXICILLIN 875 MG
875 TABLET ORAL 2 TIMES DAILY
Qty: 20 TABLET | Refills: 0 | Status: SHIPPED | OUTPATIENT
Start: 2018-04-21 | End: 2018-05-07

## 2018-04-21 ASSESSMENT — ENCOUNTER SYMPTOMS
SORE THROAT: 1
SINUS PRESSURE: 1
FATIGUE: 1
SINUS PAIN: 1
COUGH: 1

## 2018-04-21 ASSESSMENT — PAIN SCALES - GENERAL: PAINLEVEL: MILD PAIN (3)

## 2018-04-21 NOTE — PATIENT INSTRUCTIONS

## 2018-04-21 NOTE — PROGRESS NOTES
SUBJECTIVE:   Mary Gutierrez is a 48 year old female presenting with a chief complaint of   Chief Complaint   Patient presents with     Cough     Cough     Pharyngitis     Sore throat     Sinus Problem     Sinus pressure   Presents for two-week history of sinus and nasal congestion, postnasal drainage, otalgia, intermittent cough.  Has been using hot tea, steamy showers, nasal saline  Uncertain if any fever, has children in school and many exposures.  Taking fluids without difficulty, denies any nausea or diarrhea  Slight fatigue    Reports a history of sinus infections, has been treated with antibiotics before and chiropractic manipulation has facilitated drainage    Review of Systems   Constitutional: Positive for fatigue.   HENT: Positive for congestion, ear pain, sinus pain, sinus pressure and sore throat.    Respiratory: Positive for cough.    All other systems reviewed and are negative.      Past Medical History:   Diagnosis Date     Abnormal finding of blood chemistry     Discovered during infertility work up     Encounter for assisted reproductive fertility procedure cycle          Parvovirus infection     2012,confirmed with antibody; complicated by edema, lft increase, bp increase and  brief renal worsening; resolved by      Personal history of other diseases of the female genital tract      1 para 1-0-0-2 - twin gestation, status post IVF     Presence of (intrauterine) contraceptive device     05,Mirena IUD placed replaced 2010     Family History   Problem Relation Age of Onset     Other - See Comments Maternal Grandmother      Had two spontaneous losses     Ovarian Cancer Maternal Grandmother 82     Cancer-ovarian     CANCER Maternal Grandfather      Cancer,Had rectal cancer     DIABETES Other      Diabetes,Diabetes on maternal side.     Hyperlipidemia Mother      Hyperlipidemia,and CLL dx in 50s     Colon Cancer Father 60     Cancer-colon,colon and liver cancer     Current  Outpatient Prescriptions   Medication Sig Dispense Refill     amoxicillin (AMOXIL) 875 MG tablet Take 1 tablet (875 mg) by mouth 2 times daily 20 tablet 0     Cholecalciferol (VITAMIN D3) 1000 UNITS CAPS Take 1,000 Units by mouth daily       cyclobenzaprine (FLEXERIL) 10 MG tablet Take 10 mg by mouth 3 times daily       fish oil-omega-3 fatty acids 1000 MG capsule Take by mouth daily       HYDROcodone-acetaminophen (NORCO) 5-325 MG per tablet Take 1-2 tablets by mouth every 6 hours as needed for pain . Max acetaminophen dose: 4000mg in 24 hours.       HYDROcodone-acetaminophen (NORCO) 5-325 MG per tablet Take 1-2 tablets by mouth every 6 hours as needed for pain . Max acetaminophen dose: 4000mg in 24 hrs.       Multiple Vitamin (MULTI-VITAMINS) TABS Take 1 tablet by mouth daily       probiotic CAPS        Social History   Substance Use Topics     Smoking status: Never Smoker     Smokeless tobacco: Never Used     Alcohol use Yes      Comment: Alcoholic Drinks/day: 1-2 glasses of wine in a week       OBJECTIVE  /74 (BP Location: Left arm, Patient Position: Sitting, Cuff Size: Adult Regular)  Pulse 114  Temp 99.2  F (37.3  C) (Tympanic)  Resp 16  Wt 143 lb 6.4 oz (65 kg)  SpO2 99%  Breastfeeding? No  BMI 25 kg/m2    Physical Exam   Constitutional: She is oriented to person, place, and time. She appears well-developed and well-nourished.   HENT:   Head: Normocephalic and atraumatic.   Right TM erythemic with fluid bulge, no evidence of purulence  Left TM effusion with bulge    Posterior pharynx erythemic with mild tonsillar hypertrophy about 2+, no pustules, uvula midline without edema   Eyes: Conjunctivae are normal.   Neck: Normal range of motion. Neck supple.   Cardiovascular: Normal rate.    Pulmonary/Chest: Effort normal and breath sounds normal. No respiratory distress. She has no wheezes. She has no rales. She exhibits no tenderness.   Musculoskeletal: Normal range of motion.   Lymphadenopathy:      She has cervical adenopathy.   Neurological: She is alert and oriented to person, place, and time.   Skin: Skin is warm and dry.   Psychiatric: She has a normal mood and affect. Her behavior is normal. Judgment and thought content normal.   Nursing note and vitals reviewed.          ASSESSMENT:      ICD-10-CM    1. Acute maxillary sinusitis, recurrence not specified J01.00 amoxicillin (AMOXIL) 875 MG tablet   2. Acute pharyngitis, unspecified etiology J02.9 amoxicillin (AMOXIL) 875 MG tablet   3. OME (otitis media with effusion), right H65.91       Otitis media, sinusitis secondary to upper viral illness--possibly may have started with flu    PLAN:  We will start amoxicillin, continue sinus hygiene with saline and hot tea to promote flow and drainage  Tylenol as needed for comfort    Discussed expected course, cough may persist for 3 weeks however should improve weekly, should not be starting to run a fever or onset of worsening symptoms  Should notice some improvement 24-48 hours with daily improvement    Followup:    If not improving or if condition worsens, follow up with your Primary Care Provider    Patient Instructions     Sinusitis (Antibiotic Treatment)    The sinuses are air-filled spaces within the bones of the face. They connect to the inside of the nose. Sinusitis is an inflammation of the tissue that lines the sinuses. Sinusitis can occur during a cold. It can also happen due to allergies to pollens and other particles in the air. Sinusitis can cause symptoms of sinus congestion and a feeling of fullness. A sinus infection causes fever, headache, and facial pain. There is often green or yellow fluid draining from the nose or into the back of the throat (post-nasal drip). You have been given antibiotics to treat this condition.  Home care    Take the full course of antibiotics as instructed. Do not stop taking them, even when you feel better.    Drink plenty of water, hot tea, and other liquids. This may  help thin nasal mucus. It also may help your sinuses drain fluids.    Heat may help soothe painful areas of your face. Use a towel soaked in hot water. Or,  the shower and direct the warm spray onto your face. Using a vaporizer along with a menthol rub at night may also help soothe symptoms.     An expectorant with guaifenesin may help thin nasal mucus and help your sinuses drain fluids.    You can use an over-the-counter decongestant, unless a similar medicine was prescribed to you. Nasal sprays work the fastest. Use one that contains phenylephrine or oxymetazoline. First blow your nose gently. Then use the spray. Do not use these medicines more often than directed on the label. If you do, your symptoms may get worse. You may also take pills that contain pseudoephedrine. Don t use products that combine multiple medicines. This is because side effects may be increased. Read labels. You can also ask the pharmacist for help. (People with high blood pressure should not use decongestants. They can raise blood pressure.)    Over-the-counter antihistamines may help if allergies contributed to your sinusitis.      Do not use nasal rinses or irrigation during an acute sinus infection, unless your healthcare provider tells you to. Rinsing may spread the infection to other areas in your sinuses.    Use acetaminophen or ibuprofen to control pain, unless another pain medicine was prescribed to you. If you have chronic liver or kidney disease or ever had a stomach ulcer, talk with your healthcare provider before using these medicines. (Aspirin should never be taken by anyone under age 18 who is ill with a fever. It may cause severe liver damage.)    Don't smoke. This can make symptoms worse.  Follow-up care  Follow up with your healthcare provider or our staff if you are better in 1 week.  When to seek medical advice  Call your healthcare provider if any of these occur:    Facial pain or headache that gets worse    Stiff  neck    Unusual drowsiness or confusion    Swelling of your forehead or eyelids    Vision problems, such as blurred or double vision    Fever of 100.4 F (38 C) or higher, or as directed by your healthcare provider    Seizure    Breathing problems    Symptoms don't go away in 10 days  Prevention  Here are steps you can take to help prevent an infection:    Keep good hand washing habits.    Don t have close contact with people who have sore throats, colds, or other upper respiratory infections.    Don t smoke, and stay away from secondhand smoke.    Stay up to date with of your vaccines.  Date Last Reviewed: 11/1/2017 2000-2017 [employer]. [address] [place] [address], [place] [address]. All rights reserved. This information is not intended as a substitute for professional medical care. Always follow your healthcare professional's instructions.

## 2018-04-21 NOTE — NURSING NOTE
Patient presents to the clinic today for concerns of a cold x 2 weeks. States that she has had a sore throat x 1 week, headache and cough x 10 days and sinus pressure x 2-3 days. States that her symptoms have been seeming to worsen, rather than get better. Has been taking Mucinex and NyQuil at night.      Michael Kendrick LPN 04/21/18 11:46 AM

## 2018-04-21 NOTE — MR AVS SNAPSHOT
After Visit Summary   4/21/2018    Mary Gutierrez    MRN: [id number]           Patient Information     Date Of Birth          1970        Visit Information        Provider Department      4/21/2018 11:30 AM Rachael Cho APRN CNP [facility]        Today's Diagnoses     Acute maxillary sinusitis, recurrence not specified    -  1    Acute pharyngitis, unspecified etiology        OME (otitis media with effusion), right          Care Instructions      Sinusitis (Antibiotic Treatment)    The sinuses are air-filled spaces within the bones of the face. They connect to the inside of the nose. Sinusitis is an inflammation of the tissue that lines the sinuses. Sinusitis can occur during a cold. It can also happen due to allergies to pollens and other particles in the air. Sinusitis can cause symptoms of sinus congestion and a feeling of fullness. A sinus infection causes fever, headache, and facial pain. There is often green or yellow fluid draining from the nose or into the back of the throat (post-nasal drip). You have been given antibiotics to treat this condition.  Home care    Take the full course of antibiotics as instructed. Do not stop taking them, even when you feel better.    Drink plenty of water, hot tea, and other liquids. This may help thin nasal mucus. It also may help your sinuses drain fluids.    Heat may help soothe painful areas of your face. Use a towel soaked in hot water. Or,  the shower and direct the warm spray onto your face. Using a vaporizer along with a menthol rub at night may also help soothe symptoms.     An expectorant with guaifenesin may help thin nasal mucus and help your sinuses drain fluids.    You can use an over-the-counter decongestant, unless a similar medicine was prescribed to you. Nasal sprays work the fastest. Use one that contains phenylephrine or oxymetazoline. First blow your nose gently. Then use the spray. Do not use these  medicines more often than directed on the label. If you do, your symptoms may get worse. You may also take pills that contain pseudoephedrine. Don t use products that combine multiple medicines. This is because side effects may be increased. Read labels. You can also ask the pharmacist for help. (People with high blood pressure should not use decongestants. They can raise blood pressure.)    Over-the-counter antihistamines may help if allergies contributed to your sinusitis.      Do not use nasal rinses or irrigation during an acute sinus infection, unless your healthcare provider tells you to. Rinsing may spread the infection to other areas in your sinuses.    Use acetaminophen or ibuprofen to control pain, unless another pain medicine was prescribed to you. If you have chronic liver or kidney disease or ever had a stomach ulcer, talk with your healthcare provider before using these medicines. (Aspirin should never be taken by anyone under age 18 who is ill with a fever. It may cause severe liver damage.)    Don't smoke. This can make symptoms worse.  Follow-up care  Follow up with your healthcare provider or our staff if you are better in 1 week.  When to seek medical advice  Call your healthcare provider if any of these occur:    Facial pain or headache that gets worse    Stiff neck    Unusual drowsiness or confusion    Swelling of your forehead or eyelids    Vision problems, such as blurred or double vision    Fever of 100.4 F (38 C) or higher, or as directed by your healthcare provider    Seizure    Breathing problems    Symptoms don't go away in 10 days  Prevention  Here are steps you can take to help prevent an infection:    Keep good hand washing habits.    Don t have close contact with people who have sore throats, colds, or other upper respiratory infections.    Don t smoke, and stay away from secondhand smoke.    Stay up to date with of your vaccines.  Date Last Reviewed: 11/1/2017 2000-2017 The  [employer]. [address] [place] [address]. All rights reserved. This information is not intended as a substitute for professional medical care. Always follow your healthcare professional's instructions.                Follow-ups after your visit        Follow-up notes from your care team     Return if symptoms worsen or fail to improve.      Who to contact     If you have questions or need follow up information about today's clinic visit or your schedule please contact [facility] directly at [phone].  Normal or non-critical lab and imaging results will be communicated to you by [employer], letter or phone within 4 business days after the clinic has received the results. If you do not hear from us within 7 days, please contact the clinic through [employer] or phone. If you have a critical or abnormal lab result, we will notify you by phone as soon as possible.  Submit refill requests through [employer] or call your pharmacy and they will forward the refill request to us. Please allow 3 business days for your refill to be completed.          Additional Information About Your Visit        [employer] Information     [employer] gives you secure access to your electronic health record. If you see a primary care provider, you can also send messages to your care team and make appointments. If you have questions, please call your primary care clinic.  If you do not have a primary care provider, please call [phone] and they will assist you.        Care EveryWhere ID     This is your Care EveryWhere ID. This could be used by other organizations to access your [facility] medical records  [id number]        Your Vitals Were     Pulse Temperature Respirations Pulse Oximetry Breastfeeding? BMI (Body Mass Index)    114 99.2  F (37.3  C) (Tympanic) 16 99% No 25 kg/m2       Blood Pressure from Last 3 Encounters:   04/21/18 108/74   12/08/17 128/66   05/12/17 116/68    Weight from Last 3  Encounters:   04/21/18 143 lb 6.4 oz (65 kg)   12/08/17 140 lb 9.6 oz (63.8 kg)   05/12/17 135 lb 12.8 oz (61.6 kg)              Today, you had the following     No orders found for display         Today's Medication Changes          These changes are accurate as of 4/21/18 12:03 PM.  If you have any questions, ask your nurse or doctor.               Start taking these medicines.        Dose/Directions    amoxicillin 875 MG tablet   Commonly known as:  AMOXIL   Used for:  Acute maxillary sinusitis, recurrence not specified, Acute pharyngitis, unspecified etiology   Started by:  Rachael Cho APRN CNP        Dose:  875 mg   Take 1 tablet (875 mg) by mouth 2 times daily   Quantity:  20 tablet   Refills:  0            Where to get your medicines      These medications were sent to [facility] - [place], [place] - [address]  [address] [facility] [place] [address]     Phone:  [phone]     amoxicillin 875 MG tablet                Primary Care Provider Office Phone # Fax #    Jemima Mendez [phone] [address]  [facility] [address]        Equal Access to Services     KAMALA QUINONES AH: Hadii aj banda hadasho Soomaali, waaxda luqadaha, qaybta kaalmada adeegyada, tiffany smith. So [facility] [phone].    ATENCIÓN: Si habla español, tiene a lemos disposición servicios gratuitos de asistencia lingüística. Llame al [phone].    We comply with applicable federal civil rights laws and [place] laws. We do not discriminate on the basis of race, color, national origin, age, disability, sex, sexual orientation, or gender identity.            Thank you!     Thank you for choosing [facility]  for your care. Our goal is always to provide you with excellent care. Hearing back from our patients is one way we can continue to improve our services. Please take a few minutes to complete the written survey that you may  receive in the mail after your visit with us. Thank you!             Your Updated Medication List - Protect others around you: Learn how to safely use, store and throw away your medicines at www.disposemymeds.org.          This list is accurate as of 4/21/18 12:03 PM.  Always use your most recent med list.                   Brand Name Dispense Instructions for use Diagnosis    amoxicillin 875 MG tablet    AMOXIL    20 tablet    Take 1 tablet (875 mg) by mouth 2 times daily    Acute maxillary sinusitis, recurrence not specified, Acute pharyngitis, unspecified etiology       cyclobenzaprine 10 MG tablet    FLEXERIL     Take 10 mg by mouth 3 times daily        fish oil-omega-3 fatty acids 1000 MG capsule      Take by mouth daily        * HYDROcodone-acetaminophen 5-325 MG per tablet    NORCO     Take 1-2 tablets by mouth every 6 hours as needed for pain . Max acetaminophen dose: 4000mg in 24 hours.        * HYDROcodone-acetaminophen 5-325 MG per tablet    NORCO     Take 1-2 tablets by mouth every 6 hours as needed for pain . Max acetaminophen dose: 4000mg in 24 hrs.        MULTI-VITAMINS Tabs      Take 1 tablet by mouth daily        probiotic Caps           vitamin D3 1000 units Caps      Take 1,000 Units by mouth daily        * Notice:  This list has 2 medication(s) that are the same as other medications prescribed for you. Read the directions carefully, and ask your doctor or other care provider to review them with you.

## 2018-04-25 ENCOUNTER — TELEPHONE (OUTPATIENT)
Dept: FAMILY MEDICINE | Facility: OTHER | Age: 48
End: 2018-04-25

## 2018-04-25 DIAGNOSIS — G44.219 EPISODIC TENSION-TYPE HEADACHE, NOT INTRACTABLE: Primary | ICD-10-CM

## 2018-04-25 RX ORDER — HYDROCODONE BITARTRATE AND ACETAMINOPHEN 5; 325 MG/1; MG/1
1-2 TABLET ORAL EVERY 6 HOURS PRN
Qty: 60 TABLET | Refills: 0 | COMMUNITY
Start: 2018-04-25 | End: 2018-06-22

## 2018-04-25 NOTE — TELEPHONE ENCOUNTER
I updated the prescriptions. Since this info is usually available through care everywhere can this be used as a reference?  Or can a nurse update the med list based on the info in care everywhere? Jemima

## 2018-04-26 NOTE — TELEPHONE ENCOUNTER
I'm sorry I don't currently have access to care everywhere nor have I ever but I mentioned this to my manager and she said that we can look into it with [employer].  Thanks for updating the RX'S but they still don't have the DX listed so can you please tell me what that is?  Beatriz Carias on 4/26/2018 at 9:05 AM

## 2018-04-27 NOTE — TELEPHONE ENCOUNTER
I can't sign it without associating a diagnosis, so there should be one attached. The diagnosis is Episodic tension headaches. AET

## 2018-05-07 ENCOUNTER — OFFICE VISIT (OUTPATIENT)
Dept: FAMILY MEDICINE | Facility: OTHER | Age: 48
End: 2018-05-07
Attending: FAMILY MEDICINE
Payer: COMMERCIAL

## 2018-05-07 VITALS
DIASTOLIC BLOOD PRESSURE: 100 MMHG | TEMPERATURE: 97.9 F | WEIGHT: 141.2 LBS | BODY MASS INDEX: 24.62 KG/M2 | HEART RATE: 100 BPM | SYSTOLIC BLOOD PRESSURE: 157 MMHG

## 2018-05-07 DIAGNOSIS — R05.3 PERSISTENT COUGH FOR 3 WEEKS OR LONGER: Primary | ICD-10-CM

## 2018-05-07 PROBLEM — Z30.432 ENCOUNTER FOR IUD REMOVAL: Status: RESOLVED | Noted: 2018-02-01 | Resolved: 2018-05-07

## 2018-05-07 PROCEDURE — 99213 OFFICE O/P EST LOW 20 MIN: CPT | Performed by: FAMILY MEDICINE

## 2018-05-07 NOTE — MR AVS SNAPSHOT
After Visit Summary   5/7/2018    Mary Gutierrez    MRN: [id number]           Patient Information     Date Of Birth          1970        Visit Information        Provider Department      5/7/2018 10:30 AM Alva Payne MD [facility]        Today's Diagnoses     Persistent cough for 3 weeks or longer    -  1      Care Instructions    Tea and honey or Nyquil as needed.           Follow-ups after your visit        Who to contact     If you have questions or need follow up information about today's clinic visit or your schedule please contact [facility] directly at [phone].  Normal or non-critical lab and imaging results will be communicated to you by [employer], letter or phone within 4 business days after the clinic has received the results. If you do not hear from us within 7 days, please contact the clinic through [employer] or phone. If you have a critical or abnormal lab result, we will notify you by phone as soon as possible.  Submit refill requests through [employer] or call your pharmacy and they will forward the refill request to us. Please allow 3 business days for your refill to be completed.          Additional Information About Your Visit        MyChart Information     [employer] gives you secure access to your electronic health record. If you see a primary care provider, you can also send messages to your care team and make appointments. If you have questions, please call your primary care clinic.  If you do not have a primary care provider, please call [phone] and they will assist you.        Care EveryWhere ID     This is your Care EveryWhere ID. This could be used by other organizations to access your [facility] medical records  [id number]        Your Vitals Were     Pulse Temperature BMI (Body Mass Index)             100 97.9  F (36.6  C) 24.62 kg/m2          Blood Pressure from Last 3 Encounters:   05/07/18 (!) 157/100   04/21/18  108/74   12/08/17 128/66    Weight from Last 3 Encounters:   05/07/18 141 lb 3.2 oz (64 kg)   04/21/18 143 lb 6.4 oz (65 kg)   12/08/17 140 lb 9.6 oz (63.8 kg)              Today, you had the following     No orders found for display       Primary Care Provider Office Phone # Fax #    Jemima Mendez [phone]       [address] [facility] [address]        Equal Access to Services     [facility] : Hadii aad ku hadasho Soomaali, waaxda luqadaha, qaybta kaalmada adeegyada, waxjames mclean hayharsha modi . So [facility] [phone].    ATENCIÓN: Si habla español, tiene a lemos disposición servicios gratuitos de asistencia lingüística. Llame al [phone].    We comply with applicable federal civil rights laws and [place] laws. We do not discriminate on the basis of race, color, national origin, age, disability, sex, sexual orientation, or gender identity.            Thank you!     Thank you for choosing [facility]  for your care. Our goal is always to provide you with excellent care. Hearing back from our patients is one way we can continue to improve our services. Please take a few minutes to complete the written survey that you may receive in the mail after your visit with us. Thank you!             Your Updated Medication List - Protect others around you: Learn how to safely use, store and throw away your medicines at www.disposemymeds.org.          This list is accurate as of 5/7/18 11:23 AM.  Always use your most recent med list.                   Brand Name Dispense Instructions for use Diagnosis    cyclobenzaprine 10 MG tablet    FLEXERIL     Take 10 mg by mouth 3 times daily        fish oil-omega-3 fatty acids 1000 MG capsule      Take by mouth daily        * HYDROcodone-acetaminophen 5-325 MG per tablet    NORCO    60 tablet    Take 1-2 tablets by mouth every 6 hours as needed for pain . Max acetaminophen dose: 4000mg in 24 hours.        *  HYDROcodone-acetaminophen 5-325 MG per tablet    NORCO    60 tablet    Take 1-2 tablets by mouth every 6 hours as needed for pain . Max acetaminophen dose: 4000mg in 24 hrs.        MULTI-VITAMINS Tabs      Take 1 tablet by mouth daily        probiotic Caps           vitamin D3 1000 units Caps      Take 1,000 Units by mouth daily        * Notice:  This list has 2 medication(s) that are the same as other medications prescribed for you. Read the directions carefully, and ask your doctor or other care provider to review them with you.

## 2018-05-07 NOTE — NURSING NOTE
Patient presents to clinic with cough that she has had a cough for 3 weeks. She was treated with amoxicillin and which the cough did get better but once she finished that antibiotic the cough returned. It is a non productive cough.  Ying Alvarez ....................  5/7/2018   10:37 AM

## 2018-05-07 NOTE — PROGRESS NOTES
SUBJECTIVE:  Mary Gutierrez is a 48 year old female who complains of a cough. Had a URI about 4 weeks ago and then sinusitis and had 10 days of amoxicillin. After that anthony cough persisted and is dry, hacky and not productive. Awakens at night and has fits of coughing and can take awhile to get back to sleep. Has tried nyquil, cannot take codeine as it keeps her awake. No fevers, chills or other systemic issues. Distant history of asthma related to allergies and better as she got older and no longer treats. Does not feel like allergies involved in current cough.       Current Outpatient Prescriptions   Medication Sig Dispense Refill     Cholecalciferol (VITAMIN D3) 1000 UNITS CAPS Take 1,000 Units by mouth daily       cyclobenzaprine (FLEXERIL) 10 MG tablet Take 10 mg by mouth 3 times daily       fish oil-omega-3 fatty acids 1000 MG capsule Take by mouth daily       HYDROcodone-acetaminophen (NORCO) 5-325 MG per tablet Take 1-2 tablets by mouth every 6 hours as needed for pain . Max acetaminophen dose: 4000mg in 24 hours. 60 tablet 0     HYDROcodone-acetaminophen (NORCO) 5-325 MG per tablet Take 1-2 tablets by mouth every 6 hours as needed for pain . Max acetaminophen dose: 4000mg in 24 hrs. 60 tablet 0     Multiple Vitamin (MULTI-VITAMINS) TABS Take 1 tablet by mouth daily       probiotic CAPS           Allergies   Allergen Reactions     Sulfa Drugs Nausea       Social History     Social History     Marital status: Unknown     Spouse name: N/A     Number of children: N/A     Years of education: N/A     Social History Main Topics     Smoking status: Never Smoker     Smokeless tobacco: Never Used     Alcohol use Yes      Comment: Alcoholic Drinks/day: 1-2 glasses of wine in a week     Drug use: No      Comment: Drug use: Not Asked     Sexual activity: Not Asked     Other Topics Concern     None     Social History Narrative    She is  with two children. Presently at home with twin daughters.         OBJECTIVE:  BP (!) 157/100  Pulse 100  Temp 97.9  F (36.6  C)  Wt 141 lb 3.2 oz (64 kg)  BMI 24.62 kg/m2  General appearance: alert, cooperative and pleasant.    Ear exam: TMs normal but irritation and rash and external canals.   Nose: Nares normal  Sinuses: not tender  Oropharynx: moist, pink, no tonsillar hypertrophy and no exudates present  Neck: supple, non-tender, free range of motion, no adenopathy  Lungs: clear to auscultation, NO COUGH during the visit.       ASSESSMENT/PLAN:  1. Persistent cough for 3 weeks or longer        PLAN:  Symptomatic therapy suggested: use nyquil, tea with honey prn.  BP noted to be devorah today and not her norm, needs to have rechecked.   Call or return to clinic prn if these symptoms worsen or fail toimprove as anticipated.      Alva Payne ........... 11:02 AM 5/7/2018   Portions of this dictation were created using the Dragon N[employer] voice recognition system. Proofreading was completed but there may be errors in text.

## 2018-05-23 DIAGNOSIS — G44.219 EPISODIC TENSION-TYPE HEADACHE, NOT INTRACTABLE: Primary | ICD-10-CM

## 2018-05-29 RX ORDER — CYCLOBENZAPRINE HCL 10 MG
TABLET ORAL
Qty: 90 TABLET | Refills: 2 | Status: SHIPPED | OUTPATIENT
Start: 2018-05-29 | End: 2019-01-25

## 2018-05-29 NOTE — TELEPHONE ENCOUNTER
Flexeril        Last Written Prescription Date: 5/12/17 as per care everywhere  Last Fill Quantity: 90 tabs, # refills: 3 as per care everywhere  Last Office Visit: 5/7/18 with Dr. Payne as per chart review  Future Office visit: None Scheduled       Routing refill request to provider for review/approval because:  Drug not on the [facility] [place] or [facility] refill protocol or controlled substance    Chart review shows that last office visit with PCP was on 12/8/17. No changes to Rx as requested in office visit notes on that date, and patient was to follow up when she runs out of [place] as prescribed by PCP. PCP is out of the office until tomorrow, 5/30/18 and Rx request was sent on 5/23/18. Writer will hemanth up and route Rx request to PCP's teamlet for her consideration/approval at this time.    Unable to complete prescription refill per RN Medication Refill Policy. Felton Kong 5/29/2018 2:01 PM

## 2018-06-22 ENCOUNTER — OFFICE VISIT (OUTPATIENT)
Dept: FAMILY MEDICINE | Facility: OTHER | Age: 48
End: 2018-06-22
Attending: FAMILY MEDICINE
Payer: COMMERCIAL

## 2018-06-22 VITALS
DIASTOLIC BLOOD PRESSURE: 88 MMHG | WEIGHT: 144.4 LBS | TEMPERATURE: 98.8 F | BODY MASS INDEX: 25.18 KG/M2 | SYSTOLIC BLOOD PRESSURE: 142 MMHG

## 2018-06-22 DIAGNOSIS — I10 BENIGN ESSENTIAL HYPERTENSION: Primary | ICD-10-CM

## 2018-06-22 DIAGNOSIS — R05.9 COUGH: ICD-10-CM

## 2018-06-22 DIAGNOSIS — G44.219 EPISODIC TENSION-TYPE HEADACHE, NOT INTRACTABLE: ICD-10-CM

## 2018-06-22 LAB
ANION GAP SERPL CALCULATED.3IONS-SCNC: 7 MMOL/L (ref 3–14)
BUN SERPL-MCNC: 17 MG/DL (ref 7–25)
CALCIUM SERPL-MCNC: 10.1 MG/DL (ref 8.6–10.3)
CHLORIDE SERPL-SCNC: 103 MMOL/L (ref 98–107)
CO2 SERPL-SCNC: 29 MMOL/L (ref 21–31)
CREAT SERPL-MCNC: 0.97 MG/DL (ref 0.6–1.2)
GFR SERPL CREATININE-BSD FRML MDRD: 62 ML/MIN/1.7M2
GLUCOSE SERPL-MCNC: 97 MG/DL (ref 70–105)
POTASSIUM SERPL-SCNC: 4.2 MMOL/L (ref 3.5–5.1)
SODIUM SERPL-SCNC: 139 MMOL/L (ref 134–144)
TSH SERPL DL<=0.05 MIU/L-ACNC: 1.41 IU/ML (ref 0.34–5.6)

## 2018-06-22 PROCEDURE — 84443 ASSAY THYROID STIM HORMONE: CPT | Performed by: FAMILY MEDICINE

## 2018-06-22 PROCEDURE — 99214 OFFICE O/P EST MOD 30 MIN: CPT | Performed by: FAMILY MEDICINE

## 2018-06-22 PROCEDURE — 80048 BASIC METABOLIC PNL TOTAL CA: CPT | Performed by: FAMILY MEDICINE

## 2018-06-22 PROCEDURE — 36415 COLL VENOUS BLD VENIPUNCTURE: CPT | Performed by: FAMILY MEDICINE

## 2018-06-22 RX ORDER — HYDROCODONE BITARTRATE AND ACETAMINOPHEN 5; 325 MG/1; MG/1
1-2 TABLET ORAL EVERY 6 HOURS PRN
Qty: 60 TABLET | Refills: 0 | Status: SHIPPED | OUTPATIENT
Start: 2018-08-22 | End: 2018-07-29

## 2018-06-22 RX ORDER — HYDROCODONE BITARTRATE AND ACETAMINOPHEN 5; 325 MG/1; MG/1
1-2 TABLET ORAL EVERY 6 HOURS PRN
Qty: 60 TABLET | Refills: 0 | Status: SHIPPED | OUTPATIENT
Start: 2018-07-22 | End: 2018-06-22

## 2018-06-22 RX ORDER — HYDROCODONE BITARTRATE AND ACETAMINOPHEN 5; 325 MG/1; MG/1
1-2 TABLET ORAL EVERY 6 HOURS PRN
Qty: 60 TABLET | Refills: 0 | Status: SHIPPED | OUTPATIENT
Start: 2018-06-22 | End: 2018-11-02

## 2018-06-22 RX ORDER — TRIAMTERENE AND HYDROCHLOROTHIAZIDE 37.5; 25 MG/1; MG/1
1 CAPSULE ORAL DAILY
Qty: 30 CAPSULE | Refills: 1 | Status: SHIPPED | OUTPATIENT
Start: 2018-06-22 | End: 2018-07-20

## 2018-06-22 NOTE — MR AVS SNAPSHOT
"              After Visit Summary   6/22/2018    Mary Gutierrez    MRN: [id number]           Patient Information     Date Of Birth          1970        Visit Information        Provider Department      6/22/2018 1:30 PM Jemima Mendez MD [facility]        Today's Diagnoses     Benign essential hypertension    -  1    Episodic tension-type headache, not intractable        Cough           Follow-ups after your visit        Your next 10 appointments already scheduled     Jun 25, 2018 11:00 AM CDT   (Arrive by 10:45 AM)   MA SCREENING BILATERAL W/ KATELYN with GHMA2   [facility]    [address]  [place] [address]   [phone]           Three-dimensional (3D) mammograms are available at [place] locations in [facility], [facility], [facility], and [place]. [facility] locations include [place] [facility] in [place]. Benefits of 3D mammograms include: - Improved rate of cancer detection - Decreases your chance of having to go back for more tests, which means fewer: - \"False-positive\" results (This means that there is an abnormal area but it isn't cancer.) - Invasive testing procedures, such as a biopsy or surgery - Can provide clearer images of the breast if you have dense breast tissue. 3D mammography is an optional exam that anyone can have with a 2D mammogram. It doesn't replace or take the place of a 2D mammogram. 2D mammograms remain an effective screening test for all women.  Not all insurance companies cover the cost of a 3D mammogram. Check with your insurance.            Jul 30, 2018  1:15 PM CDT   LAB with GH LAB   [facility]    [address] [facility] [id number] [phone]           Please do not eat 10-12 hours before your appointment if you are coming in fasting for labs on " lipids, cholesterol, or glucose (sugar). This does not apply to pregnant women. Water, hot tea and black coffee (with nothing added) are okay. Do not drink other fluids, diet soda or chew gum.            Jul 30, 2018  1:45 PM CDT   Office Visit with Jemima Mendez MD   [facility] ([facility]    [address]  [place] [address] [phone]           Bring a current list of meds and any records pertaining to this visit. For Physicals, please bring immunization records and any forms needing to be filled out. Please arrive 10 minutes early to complete paperwork.              Who to contact     If you have questions or need follow up information about today's clinic visit or your schedule please contact [facility] directly at [phone].  Normal or non-critical lab and imaging results will be communicated to you by [employer], letter or phone within 4 business days after the clinic has received the results. If you do not hear from us within 7 days, please contact the clinic through [employer] or phone. If you have a critical or abnormal lab result, we will notify you by phone as soon as possible.  Submit refill requests through [employer] or call your pharmacy and they will forward the refill request to us. Please allow 3 business days for your refill to be completed.          Additional Information About Your Visit        MyChart Information     [employer] gives you secure access to your electronic health record. If you see a primary care provider, you can also send messages to your care team and make appointments. If you have questions, please call your primary care clinic.  If you do not have a primary care provider, please call [phone] and they will assist you.        Care EveryWhere ID     This is your Care EveryWhere ID. This could be used by other organizations to access your [facility] medical records  [id number]        Your Vitals  Were     Temperature BMI (Body Mass Index)                98.8  F (37.1  C) (Tympanic) 25.18 kg/m2           Blood Pressure from Last 3 Encounters:   06/22/18 142/88   05/07/18 (!) 157/100   04/21/18 108/74    Weight from Last 3 Encounters:   06/22/18 144 lb 6.4 oz (65.5 kg)   05/07/18 141 lb 3.2 oz (64 kg)   04/21/18 143 lb 6.4 oz (65 kg)              We Performed the Following     Basic metabolic panel     TSH          Today's Medication Changes          These changes are accurate as of 6/22/18  4:23 PM.  If you have any questions, ask your nurse or doctor.               Start taking these medicines.        Dose/Directions    triamterene-hydrochlorothiazide 37.5-25 MG per capsule   Commonly known as:  DYAZIDE   Used for:  Benign essential hypertension   Started by:  Jemima Mendez MD        Dose:  1 capsule   Take 1 capsule by mouth daily   Quantity:  30 capsule   Refills:  1         These medicines have changed or have updated prescriptions.        Dose/Directions    * HYDROcodone-acetaminophen 5-325 MG per tablet   Commonly known as:  NORCO   This may have changed:  Another medication with the same name was added. Make sure you understand how and when to take each.   Used for:  Episodic tension-type headache, not intractable   Changed by:  Jemima Mendez MD        Dose:  1-2 tablet   Take 1-2 tablets by mouth every 6 hours as needed for pain . Max acetaminophen dose: 4000mg in 24 hrs.   Quantity:  60 tablet   Refills:  0       * HYDROcodone-acetaminophen 5-325 MG per tablet   Commonly known as:  NORCO   This may have changed:  You were already taking a medication with the same name, and this prescription was added. Make sure you understand how and when to take each.   Used for:  Episodic tension-type headache, not intractable   Changed by:  Jemima Mendez MD        Dose:  1-2 tablet   Start taking on:  8/22/2018   Take 1-2 tablets by mouth every 6 hours as needed for pain . Max acetaminophen dose: 4000mg in 24  hours.   Quantity:  60 tablet   Refills:  0       * Notice:  This list has 2 medication(s) that are the same as other medications prescribed for you. Read the directions carefully, and ask your doctor or other care provider to review them with you.         Where to get your medicines      These medications were sent to [facility] - [place], [place] - [address]  [address], [facility] [address]     Phone:  [phone]     triamterene-hydrochlorothiazide 37.5-25 MG per capsule         Some of these will need a paper prescription and others can be bought over the counter.  Ask your nurse if you have questions.     Bring a paper prescription for each of these medications     HYDROcodone-acetaminophen 5-325 MG per tablet    HYDROcodone-acetaminophen 5-325 MG per tablet               Information about OPIOIDS     PRESCRIPTION OPIOIDS: WHAT YOU NEED TO KNOW   We gave you an opioid (narcotic) pain medicine. It is important to manage your pain, but opioids are not always the best choice. You should first try all the other options your care team gave you. Take this medicine for as short a time (and as few doses) as possible.     These medicines have risks:    DO NOT drive when on new or higher doses of pain medicine. These medicines can affect your alertness and reaction times, and you could be arrested for driving under the influence (DUI). If you need to use opioids long-term, talk to your care team about driving.    DO NOT operate heave machinery    DO NOT do any other dangerous activities while taking these medicines.     DO NOT drink any alcohol while taking these medicines.      If the opioid prescribed includes acetaminophen, DO NOT take with any other medicines that contain acetaminophen. Read all labels carefully. Look for the word  acetaminophen  or  Tylenol.  Ask your pharmacist if you have questions or are unsure.    You can get addicted to pain medicines, especially if  you have a history of addiction (chemical, alcohol or substance dependence). Talk to your care team about ways to reduce this risk.    Store your pills in a secure place, locked if possible. We will not replace any lost or stolen medicine. If you don t finish your medicine, please throw away (dispose) as directed by your pharmacist. The [place] Pollution Control Agency has more information about safe disposal: https://www.pca.[facility].[place].us/living-green/managing-unwanted-medications.     All opioids tend to cause constipation. Drink plenty of water and eat foods that have a lot of fiber, such as fruits, vegetables, prune juice, apple juice and high-fiber cereal. Take a laxative (Miralax, milk of magnesia, Colace, Senna) if you don t move your bowels at least every other day.          Primary Care Provider Office Phone # Fax #    Jemima Mendez [phone] [address] [facility] [address]        Equal Access to Services     ALAYNA [facility] Center[facility]TA : Hadii aad ku hadasho Soomaali, waaxda luqadaha, qaybta kaalmada adeegyada, waxay tomásin bette modi . So [facility] [phone].    ATENCIÓN: Si habla español, tiene a lemos disposición servicios gratuitos de asistencia lingüística. Llame al [phone].    We comply with applicable federal civil rights laws and [place] laws. We do not discriminate on the basis of race, color, national origin, age, disability, sex, sexual orientation, or gender identity.            Thank you!     Thank you for choosing [facility] AND [facility]  for your care. Our goal is always to provide you with excellent care. Hearing back from our patients is one way we can continue to improve our services. Please take a few minutes to complete the written survey that you may receive in the mail after your visit with us. Thank you!             Your Updated Medication List - Protect others around you: Learn how to safely use, store and throw away your medicines  at www.disposemymeds.org.          This list is accurate as of 6/22/18  4:23 PM.  Always use your most recent med list.                   Brand Name Dispense Instructions for use Diagnosis    cyclobenzaprine 10 MG tablet    FLEXERIL    90 tablet    TAKE 1 TABLET BY MOUTH THREE TIMES DAILY    Episodic tension-type headache, not intractable       fish oil-omega-3 fatty acids 1000 MG capsule      Take by mouth daily        * HYDROcodone-acetaminophen 5-325 MG per tablet    NORCO    60 tablet    Take 1-2 tablets by mouth every 6 hours as needed for pain . Max acetaminophen dose: 4000mg in 24 hrs.    Episodic tension-type headache, not intractable       * HYDROcodone-acetaminophen 5-325 MG per tablet   Start taking on:  8/22/2018    NORCO    60 tablet    Take 1-2 tablets by mouth every 6 hours as needed for pain . Max acetaminophen dose: 4000mg in 24 hours.    Episodic tension-type headache, not intractable       MULTI-VITAMINS Tabs      Take 1 tablet by mouth daily        probiotic Caps           triamterene-hydrochlorothiazide 37.5-25 MG per capsule    DYAZIDE    30 capsule    Take 1 capsule by mouth daily    Benign essential hypertension       vitamin D3 1000 units Caps      Take 1,000 Units by mouth daily        * Notice:  This list has 2 medication(s) that are the same as other medications prescribed for you. Read the directions carefully, and ask your doctor or other care provider to review them with you.

## 2018-06-22 NOTE — NURSING NOTE
Patient here for on going cough for 2 months. symptoms started in April with bad cold symptoms and cough. All symptoms but the cough have gone. She doesn't feel congested but has the cough still.  Marlyn Daley LPN ..........6/22/2018 1:40 PM

## 2018-06-22 NOTE — PROGRESS NOTES
SUBJECTIVE:   Mary Gutierrez is a 48 year old female who presents to clinic today for the following health issues:    HPI Comments: Patient had a cold about 2 months ago. Most symptoms have resolved, but cough has persisted.   At times, cough seems more productive. Seems to originate in her upper airway, not her lungs.   Patient thought she maybe also had allergies.   Did have asthma as a teenager.   Cough does not seem to wake her up at night.   Does have some issues with acid reflux. Gets sudden onset of acid rising up to her throat.   blood pressure was elevated at clinic visit 5/7/18.  She has been monitoring some at home, and SBP has been 130-140 on occasion. Yesterday blood pressure was 118/85.  Patient is no longer taking any over the counter cold medication that could be contributing to her blood pressure.   The patient's mom was around this age when she started to have high blood pressure.       Review of Systems see HPI, ROS otherwise negative.     OBJECTIVE:     /88 (BP Location: Right arm, Patient Position: Sitting, Cuff Size: Adult Regular)  Temp 98.8  F (37.1  C) (Tympanic)  Wt 144 lb 6.4 oz (65.5 kg)  BMI 25.18 kg/m2  Body mass index is 25.18 kg/(m^2).  Physical Exam   Constitutional: She is oriented to person, place, and time. She appears well-developed and well-nourished.   HENT:   TMs appear normal.    Eyes: Conjunctivae are normal.   Neck: No thyromegaly present.   Cardiovascular: Normal rate, regular rhythm and normal heart sounds.    No murmur heard.  Pulmonary/Chest: Effort normal and breath sounds normal. No respiratory distress. She has no wheezes. She has no rales.   Abdominal: Soft.   Musculoskeletal: She exhibits no edema.   Lymphadenopathy:     She has no cervical adenopathy.   Neurological: She is alert and oriented to person, place, and time.   Skin: No rash noted.   Psychiatric: She has a normal mood and affect.     Results for orders placed or performed in visit on  06/22/18   TSH   Result Value Ref Range    Thyrotropin 1.41 0.34 - 5.60 IU/mL   Basic metabolic panel   Result Value Ref Range    Sodium 139 134 - 144 mmol/L    Potassium 4.2 3.5 - 5.1 mmol/L    Chloride 103 98 - 107 mmol/L    Carbon Dioxide 29 21 - 31 mmol/L    Anion Gap 7 3 - 14 mmol/L    Glucose 97 70 - 105 mg/dL    Urea Nitrogen 17 7 - 25 mg/dL    Creatinine 0.97 0.60 - 1.20 mg/dL    GFR Estimate 62 >60 mL/min/1.7m2    GFR Estimate If Black 75 >60 mL/min/1.7m2    Calcium 10.1 8.6 - 10.3 mg/dL         ASSESSMENT/PLAN:         ICD-10-CM    1. Benign essential hypertension I10 TSH     Basic metabolic panel     triamterene-hydrochlorothiazide (DYAZIDE) 37.5-25 MG per capsule   2. Episodic tension-type headache, not intractable G44.219 HYDROcodone-acetaminophen (NORCO) 5-325 MG per tablet     HYDROcodone-acetaminophen (NORCO) 5-325 MG per tablet     DISCONTINUED: HYDROcodone-acetaminophen (NORCO) 5-325 MG per tablet   3. Cough R05      Trial of PPI + Nasal steroid spray for cough.   Recommend starting medications for management of HTN. Avoid ACE I due to potential cough.  Pain medications refilled.  database printed, reviewed and signed.   Follow up in 6 weeks for blood pressure check and repeat labs.     Jemima Mendez MD  [facility]

## 2018-06-25 ENCOUNTER — HOSPITAL ENCOUNTER (OUTPATIENT)
Dept: MAMMOGRAPHY | Facility: OTHER | Age: 48
Discharge: HOME | End: 2018-06-25
Attending: FAMILY MEDICINE | Admitting: FAMILY MEDICINE
Payer: COMMERCIAL

## 2018-06-25 DIAGNOSIS — Z12.31 VISIT FOR SCREENING MAMMOGRAM: ICD-10-CM

## 2018-06-25 PROCEDURE — 77063 BREAST TOMOSYNTHESIS BI: CPT

## 2018-07-02 ENCOUNTER — MYC MEDICAL ADVICE (OUTPATIENT)
Dept: FAMILY MEDICINE | Facility: OTHER | Age: 48
End: 2018-07-02

## 2018-07-02 DIAGNOSIS — I10 BENIGN ESSENTIAL HYPERTENSION: ICD-10-CM

## 2018-07-02 DIAGNOSIS — R00.0 SINUS TACHYCARDIA: Primary | ICD-10-CM

## 2018-07-02 RX ORDER — LISINOPRIL AND HYDROCHLOROTHIAZIDE 20; 25 MG/1; MG/1
1 TABLET ORAL DAILY
Qty: 30 TABLET | Refills: 1 | Status: SHIPPED | OUTPATIENT
Start: 2018-07-02 | End: 2018-08-24

## 2018-07-18 ENCOUNTER — HOSPITAL ENCOUNTER (OUTPATIENT)
Dept: CARDIOLOGY | Facility: OTHER | Age: 48
Discharge: HOME | End: 2018-07-18
Attending: FAMILY MEDICINE | Admitting: FAMILY MEDICINE
Payer: COMMERCIAL

## 2018-07-18 ENCOUNTER — HOSPITAL ENCOUNTER (OUTPATIENT)
Dept: RESPIRATORY THERAPY | Facility: OTHER | Age: 48
End: 2018-07-18
Attending: FAMILY MEDICINE
Payer: COMMERCIAL

## 2018-07-18 DIAGNOSIS — R00.0 SINUS TACHYCARDIA: ICD-10-CM

## 2018-07-18 DIAGNOSIS — I10 BENIGN ESSENTIAL HYPERTENSION: ICD-10-CM

## 2018-07-18 PROCEDURE — 93225 XTRNL ECG REC<48 HRS REC: CPT

## 2018-07-18 PROCEDURE — 93306 TTE W/DOPPLER COMPLETE: CPT

## 2018-07-18 PROCEDURE — 40000275 ZZH STATISTIC RCP TIME EA 10 MIN

## 2018-07-18 PROCEDURE — 93227 XTRNL ECG REC<48 HR R&I: CPT | Performed by: INTERNAL MEDICINE

## 2018-07-18 PROCEDURE — 93306 TTE W/DOPPLER COMPLETE: CPT | Mod: 26 | Performed by: INTERNAL MEDICINE

## 2018-07-18 PROCEDURE — 93226 XTRNL ECG REC<48 HR SCAN A/R: CPT

## 2018-07-20 ENCOUNTER — OFFICE VISIT (OUTPATIENT)
Dept: FAMILY MEDICINE | Facility: OTHER | Age: 48
End: 2018-07-20
Attending: FAMILY MEDICINE
Payer: COMMERCIAL

## 2018-07-20 VITALS
SYSTOLIC BLOOD PRESSURE: 120 MMHG | WEIGHT: 144.8 LBS | HEART RATE: 68 BPM | DIASTOLIC BLOOD PRESSURE: 62 MMHG | BODY MASS INDEX: 25.25 KG/M2

## 2018-07-20 DIAGNOSIS — I10 ESSENTIAL HYPERTENSION: ICD-10-CM

## 2018-07-20 PROCEDURE — 99213 OFFICE O/P EST LOW 20 MIN: CPT | Performed by: FAMILY MEDICINE

## 2018-07-20 ASSESSMENT — PAIN SCALES - GENERAL: PAINLEVEL: NO PAIN (0)

## 2018-07-20 NOTE — MR AVS SNAPSHOT
After Visit Summary   7/20/2018    Mary Gutierrez    MRN: [id number]           Patient Information     Date Of Birth          1970        Visit Information        Provider Department      7/20/2018 8:15 AM Jemima Mendez MD [facility]        Today's Diagnoses     Essential hypertension           Follow-ups after your visit        Your next 10 appointments already scheduled     Jul 30, 2018  1:15 PM CDT   LAB with GH LAB   [facility]    [address] [facility] [id number] [phone]           Please do not eat 10-12 hours before your appointment if you are coming in fasting for labs on lipids, cholesterol, or glucose (sugar). This does not apply to pregnant women. Water, hot tea and black coffee (with nothing added) are okay. Do not drink other fluids, diet soda or chew gum.            Jul 30, 2018  1:45 PM CDT   Office Visit with Jemima Mendez MD   [facility]    [address]  [place] [address] [phone]           Bring a current list of meds and any records pertaining to this visit. For Physicals, please bring immunization records and any forms needing to be filled out. Please arrive 10 minutes early to complete paperwork.              Future tests that were ordered for you today     Open Future Orders        Priority Expected Expires Ordered    Basic Metabolic Panel Routine 8/15/2018 7/20/2019 7/20/2018            Who to contact     If you have questions or need follow up information about today's clinic visit or your schedule please contact [facility] directly at [phone].  Normal or non-critical lab and imaging results will be communicated to you by MyChart, letter or phone within 4 business days after the clinic has received the results. If you do not hear from us within 7 days, please contact the  clinic through [employer] or phone. If you have a critical or abnormal lab result, we will notify you by phone as soon as possible.  Submit refill requests through [employer] or call your pharmacy and they will forward the refill request to us. Please allow 3 business days for your refill to be completed.          Additional Information About Your Visit        [employer] Information     [employer] gives you secure access to your electronic health record. If you see a primary care provider, you can also send messages to your care team and make appointments. If you have questions, please call your primary care clinic.  If you do not have a primary care provider, please call [phone] and they will assist you.        Care EveryWhere ID     This is your Care EveryWhere ID. This could be used by other organizations to access your [place] medical records  [id number]        Your Vitals Were     Pulse BMI (Body Mass Index)                68 25.25 kg/m2           Blood Pressure from Last 3 Encounters:   07/20/18 120/62   06/22/18 142/88   05/07/18 (!) 157/100    Weight from Last 3 Encounters:   07/20/18 144 lb 12.8 oz (65.7 kg)   06/22/18 144 lb 6.4 oz (65.5 kg)   05/07/18 141 lb 3.2 oz (64 kg)                 Today's Medication Changes          These changes are accurate as of 7/20/18  8:46 AM.  If you have any questions, ask your nurse or doctor.               Stop taking these medicines if you haven't already. Please contact your care team if you have questions.     triamterene-hydrochlorothiazide 37.5-25 MG per capsule   Commonly known as:  DYAZIDE   Stopped by:  Jemima Mendez MD                    Primary Care Provider Office Phone # Fax #    Jemima Mendez [phone] [address] [facility] [address]        Equal Access to Services     KAMALA QUINONES : Jass Forrester, shirley davison, qaybta kaalmatiffany gomez. So [facility]  [phone].    ATENCIÓN: Si chad ray, tiene a lemos disposición servicios gratuitos de asistencia lingüística. Hans reynoso [phone].    We comply with applicable federal civil rights laws and [place] laws. We do not discriminate on the basis of race, color, national origin, age, disability, sex, sexual orientation, or gender identity.            Thank you!     Thank you for choosing [facility] AND [facility]  for your care. Our goal is always to provide you with excellent care. Hearing back from our patients is one way we can continue to improve our services. Please take a few minutes to complete the written survey that you may receive in the mail after your visit with us. Thank you!             Your Updated Medication List - Protect others around you: Learn how to safely use, store and throw away your medicines at www.disposemymeds.org.          This list is accurate as of 7/20/18  8:46 AM.  Always use your most recent med list.                   Brand Name Dispense Instructions for use Diagnosis    cyclobenzaprine 10 MG tablet    FLEXERIL    90 tablet    TAKE 1 TABLET BY MOUTH THREE TIMES DAILY    Episodic tension-type headache, not intractable       fish oil-omega-3 fatty acids 1000 MG capsule      Take by mouth daily        * HYDROcodone-acetaminophen 5-325 MG per tablet    NORCO    60 tablet    Take 1-2 tablets by mouth every 6 hours as needed for pain . Max acetaminophen dose: 4000mg in 24 hrs.    Episodic tension-type headache, not intractable       * HYDROcodone-acetaminophen 5-325 MG per tablet   Start taking on:  8/22/2018    NORCO    60 tablet    Take 1-2 tablets by mouth every 6 hours as needed for pain . Max acetaminophen dose: 4000mg in 24 hours.    Episodic tension-type headache, not intractable       lisinopril-hydrochlorothiazide 20-25 MG per tablet    PRINZIDE/ZESTORETIC    30 tablet    Take 1 tablet by mouth daily    Benign essential hypertension       MULTI-VITAMINS Tabs      Take 1  tablet by mouth daily        probiotic Caps           vitamin D3 1000 units Caps      Take 1,000 Units by mouth daily        * Notice:  This list has 2 medication(s) that are the same as other medications prescribed for you. Read the directions carefully, and ask your doctor or other care provider to review them with you.

## 2018-07-20 NOTE — PROGRESS NOTES
SUBJECTIVE:   Mary Gutierrez is a 48 year old female who presents to clinic today for the following health issues:    HPI Comments: Patient presents to follow-up on hypertension.  Since our last visit, she is discuss this further with her mother who tells her that almost everyone in the family has had high blood pressure by the age of 40.  She seems to be tolerating lisinopril/ hydrochlorothiazide.  Blood pressure has been better controlled.  She recently had an echocardiogram and is handing in her Holter monitor today.      Patient Active Problem List    Diagnosis Date Noted     Essential hypertension 07/20/2018     Priority: Medium     Asthma 02/01/2018     Priority: Medium     Overview:   allergy induced       Episodic tension type headache 02/01/2018     Priority: Medium     Hypercholesterolemia 02/01/2018     Priority: Medium     Acquired deformity of ankle and foot 02/01/2018     Priority: Medium     Other affections of shoulder region, not elsewhere classified 08/29/2011     Priority: Medium         Review of Systems see HPI, review of systems is otherwise negative.    OBJECTIVE:     /62 (BP Location: Right arm, Patient Position: Sitting, Cuff Size: Adult Regular)  Pulse 68  Wt 144 lb 12.8 oz (65.7 kg)  BMI 25.25 kg/m2  Body mass index is 25.25 kg/(m^2).  Physical Exam   Constitutional: She is oriented to person, place, and time. She appears well-developed and well-nourished.   Eyes: Conjunctivae are normal.   Neck: No thyromegaly present.   Cardiovascular: Normal rate, regular rhythm and normal heart sounds.    No murmur heard.  Pulmonary/Chest: Effort normal and breath sounds normal. No respiratory distress.   Abdominal: Soft.   Musculoskeletal: She exhibits no edema.   Lymphadenopathy:     She has no cervical adenopathy.   Neurological: She is alert and oriented to person, place, and time.   Skin: No rash noted.   Psychiatric: She has a normal mood and affect.     ECHO results:    Interpretation Summary  Global and regional left ventricular function is normal with an EF of 60-65%.  Left ventricular diastolic function is normal.  Right ventricular function, chamber size, wall motion, and thickness are  normal.  Both atria appear normal.     Pulmonary artery systolic pressure cannot be assessed.  The inferior vena cava was normal in size with preserved respiratory  variability.    No pericardial effusion is present.      ASSESSMENT/PLAN:         ICD-10-CM    1. Essential hypertension I10 Basic Metabolic Panel     Continue with lisinopril/HCTZ. Follow up mid August for labs. Will contact patient with holter results.     Jemima Mendez MD  [facility]

## 2018-07-26 ENCOUNTER — OFFICE VISIT (OUTPATIENT)
Dept: FAMILY MEDICINE | Facility: OTHER | Age: 48
End: 2018-07-26
Attending: FAMILY MEDICINE
Payer: COMMERCIAL

## 2018-07-26 VITALS
DIASTOLIC BLOOD PRESSURE: 68 MMHG | BODY MASS INDEX: 25.28 KG/M2 | SYSTOLIC BLOOD PRESSURE: 114 MMHG | TEMPERATURE: 99.9 F | WEIGHT: 145 LBS | HEART RATE: 129 BPM

## 2018-07-26 DIAGNOSIS — R39.9 UTI SYMPTOMS: Primary | ICD-10-CM

## 2018-07-26 DIAGNOSIS — R50.9 FEVER, UNSPECIFIED FEVER CAUSE: ICD-10-CM

## 2018-07-26 PROBLEM — J45.909 ASTHMA: Status: RESOLVED | Noted: 2018-02-01 | Resolved: 2018-07-26

## 2018-07-26 LAB
ALBUMIN SERPL-MCNC: 3.7 G/DL (ref 3.5–5.7)
ALBUMIN UR-MCNC: ABNORMAL MG/DL
ALP SERPL-CCNC: 77 U/L (ref 34–104)
ALT SERPL W P-5'-P-CCNC: 28 U/L (ref 7–52)
ANION GAP SERPL CALCULATED.3IONS-SCNC: 9 MMOL/L (ref 3–14)
APPEARANCE UR: ABNORMAL
AST SERPL W P-5'-P-CCNC: 22 U/L (ref 13–39)
BACTERIA #/AREA URNS HPF: ABNORMAL /HPF
BASOPHILS # BLD AUTO: 0 10E9/L (ref 0–0.2)
BASOPHILS NFR BLD AUTO: 0.1 %
BILIRUB SERPL-MCNC: 0.8 MG/DL (ref 0.3–1)
BILIRUB UR QL STRIP: NEGATIVE
BUN SERPL-MCNC: 19 MG/DL (ref 7–25)
CALCIUM SERPL-MCNC: 9.8 MG/DL (ref 8.6–10.3)
CHLORIDE SERPL-SCNC: 93 MMOL/L (ref 98–107)
CO2 SERPL-SCNC: 29 MMOL/L (ref 21–31)
COLOR UR AUTO: YELLOW
CREAT SERPL-MCNC: 1.1 MG/DL (ref 0.6–1.2)
DIFFERENTIAL METHOD BLD: ABNORMAL
EOSINOPHIL # BLD AUTO: 0 10E9/L (ref 0–0.7)
EOSINOPHIL NFR BLD AUTO: 0 %
ERYTHROCYTE [DISTWIDTH] IN BLOOD BY AUTOMATED COUNT: 13 % (ref 10–15)
GFR SERPL CREATININE-BSD FRML MDRD: 53 ML/MIN/1.7M2
GLUCOSE SERPL-MCNC: 151 MG/DL (ref 70–105)
GLUCOSE UR STRIP-MCNC: NEGATIVE MG/DL
HCT VFR BLD AUTO: 38.8 % (ref 35–47)
HGB BLD-MCNC: 13 G/DL (ref 11.7–15.7)
HGB UR QL STRIP: ABNORMAL
IMM GRANULOCYTES # BLD: 0.2 10E9/L (ref 0–0.4)
IMM GRANULOCYTES NFR BLD: 1.1 %
KETONES UR STRIP-MCNC: NEGATIVE MG/DL
LEUKOCYTE ESTERASE UR QL STRIP: ABNORMAL
LYMPHOCYTES # BLD AUTO: 0.4 10E9/L (ref 0.8–5.3)
LYMPHOCYTES NFR BLD AUTO: 2.5 %
MCH RBC QN AUTO: 31.2 PG (ref 26.5–33)
MCHC RBC AUTO-ENTMCNC: 33.5 G/DL (ref 31.5–36.5)
MCV RBC AUTO: 93 FL (ref 78–100)
MONOCYTES # BLD AUTO: 0.6 10E9/L (ref 0–1.3)
MONOCYTES NFR BLD AUTO: 4.4 %
NEUTROPHILS # BLD AUTO: 13.1 10E9/L (ref 1.6–8.3)
NEUTROPHILS NFR BLD AUTO: 91.9 %
NITRATE UR QL: POSITIVE
PH UR STRIP: 6 PH (ref 5–7)
PLATELET # BLD AUTO: 223 10E9/L (ref 150–450)
POTASSIUM SERPL-SCNC: 3.7 MMOL/L (ref 3.5–5.1)
PROT SERPL-MCNC: 7.3 G/DL (ref 6.4–8.9)
RBC # BLD AUTO: 4.17 10E12/L (ref 3.8–5.2)
RBC #/AREA URNS AUTO: ABNORMAL /HPF
SODIUM SERPL-SCNC: 131 MMOL/L (ref 134–144)
SOURCE: ABNORMAL
SP GR UR STRIP: 1.01 (ref 1–1.03)
UROBILINOGEN UR STRIP-ACNC: 0.2 EU/DL (ref 0.2–1)
WBC # BLD AUTO: 14.3 10E9/L (ref 4–11)
WBC #/AREA URNS AUTO: ABNORMAL /HPF

## 2018-07-26 PROCEDURE — 87086 URINE CULTURE/COLONY COUNT: CPT | Performed by: FAMILY MEDICINE

## 2018-07-26 PROCEDURE — 80053 COMPREHEN METABOLIC PANEL: CPT | Performed by: FAMILY MEDICINE

## 2018-07-26 PROCEDURE — 96372 THER/PROPH/DIAG INJ SC/IM: CPT

## 2018-07-26 PROCEDURE — 99214 OFFICE O/P EST MOD 30 MIN: CPT | Performed by: FAMILY MEDICINE

## 2018-07-26 PROCEDURE — 25000125 ZZHC RX 250: Performed by: FAMILY MEDICINE

## 2018-07-26 PROCEDURE — 87798 DETECT AGENT NOS DNA AMP: CPT | Mod: 91 | Performed by: FAMILY MEDICINE

## 2018-07-26 PROCEDURE — 25000128 H RX IP 250 OP 636: Performed by: FAMILY MEDICINE

## 2018-07-26 PROCEDURE — 85025 COMPLETE CBC W/AUTO DIFF WBC: CPT | Performed by: FAMILY MEDICINE

## 2018-07-26 PROCEDURE — 81001 URINALYSIS AUTO W/SCOPE: CPT | Performed by: FAMILY MEDICINE

## 2018-07-26 PROCEDURE — 86618 LYME DISEASE ANTIBODY: CPT | Performed by: FAMILY MEDICINE

## 2018-07-26 PROCEDURE — 36415 COLL VENOUS BLD VENIPUNCTURE: CPT | Performed by: FAMILY MEDICINE

## 2018-07-26 RX ORDER — CIPROFLOXACIN 500 MG/1
500 TABLET, FILM COATED ORAL 2 TIMES DAILY
Qty: 14 TABLET | Refills: 0 | Status: SHIPPED | OUTPATIENT
Start: 2018-07-26 | End: 2018-11-02

## 2018-07-26 RX ORDER — DOXYCYCLINE 100 MG/1
100 CAPSULE ORAL 2 TIMES DAILY
Qty: 28 CAPSULE | Refills: 0 | Status: SHIPPED | OUTPATIENT
Start: 2018-07-26 | End: 2018-07-26

## 2018-07-26 RX ADMIN — CEFTRIAXONE SODIUM 1 G: 1 INJECTION, POWDER, FOR SOLUTION INTRAMUSCULAR; INTRAVENOUS at 15:00

## 2018-07-26 ASSESSMENT — ENCOUNTER SYMPTOMS
SINUS PAIN: 0
HEMATURIA: 0
SINUS PRESSURE: 0
FLANK PAIN: 0
DIARRHEA: 0
NAUSEA: 0
FREQUENCY: 0
SORE THROAT: 0
DYSURIA: 1
BACK PAIN: 0
PALPITATIONS: 0
DIFFICULTY URINATING: 0
VOMITING: 0
FATIGUE: 1
ARTHRALGIAS: 0
APNEA: 0
COUGH: 0
FEVER: 1
ABDOMINAL PAIN: 0
CHILLS: 1
EYE REDNESS: 0
JOINT SWELLING: 0

## 2018-07-26 ASSESSMENT — PAIN SCALES - GENERAL: PAINLEVEL: SEVERE PAIN (6)

## 2018-07-26 NOTE — PROGRESS NOTES
SUBJECTIVE:   Mary Gutierrez is a 48 year old female who presents to clinic today for the following health issues:  Nursing Notes:   Yana Chopra LPN  2018  2:46 PM  Unsigned  Patient is here for uti symptoms and fever. Started  feeling tired fever started Tuesday, Monday started burning with urination.  Yana Chopra LPN .............2018     2:45 PM      HPI    48-year-old female presents with 5 day history of low-grade fever, chills, malaise and headache.  2 days ago she started having mild burning with urination.  She denies any urinary hesitancy, incontinence, flank pain, or hematuria.    She believes her last UTI was over 10 years ago.  She has no history of kidney stones.  Denies any recent respiratory symptoms of sore throat, cough, cold, shortness of breath.No sick contacts.     Denies known tick bite or rash.    Recently changed antihypertensive agents and is now on lisinopril HCTZ.  Blood pressure is within goal.    Patient Active Problem List    Diagnosis Date Noted     Essential hypertension 2018     Priority: Medium     Episodic tension type headache 2018     Priority: Medium     Hypercholesterolemia 2018     Priority: Medium     Acquired deformity of ankle and foot 2018     Priority: Medium     Other affections of shoulder region, not elsewhere classified 2011     Priority: Medium     Past Medical History:   Diagnosis Date     Abnormal finding of blood chemistry     Discovered during infertility work up     Encounter for assisted reproductive fertility procedure cycle     2004     Parvovirus infection     2012,confirmed with antibody; complicated by edema, lft increase, bp increase and  brief renal worsening; resolved by      Personal history of other diseases of the female genital tract      1 para 1-0-0-2 - twin gestation, status post IVF     Presence of (intrauterine) contraceptive device     05,Mirena IUD placed replaced  2010      Past Surgical History:   Procedure Laterality Date      SECTION       section - breech/breech twins     COLONOSCOPY      2013,[place], normal     OTHER SURGICAL HISTORY      46772,ORAL SURGERY,[place] teeth     OTHER SURGICAL HISTORY      474282,OTHER,IVF       Review of Systems   Constitutional: Positive for chills, fatigue and fever.   HENT: Negative for congestion, sinus pain, sinus pressure and sore throat.    Eyes: Negative for redness.   Respiratory: Negative for apnea and cough.    Cardiovascular: Negative for chest pain and palpitations.   Gastrointestinal: Negative for abdominal pain, diarrhea, nausea and vomiting.   Genitourinary: Positive for dysuria. Negative for decreased urine volume, difficulty urinating, enuresis, flank pain, frequency, genital sores, hematuria, pelvic pain, urgency, vaginal discharge and vaginal pain.   Musculoskeletal: Negative for arthralgias, back pain and joint swelling.   Skin: Negative for rash.        OBJECTIVE:     /68  Pulse 129  Temp 99.9  F (37.7  C)  Wt 145 lb (65.8 kg)  BMI 25.28 kg/m2  Body mass index is 25.28 kg/(m^2).  Physical Exam   Constitutional: She appears distressed.   HENT:   Right Ear: External ear normal.   Left Ear: External ear normal.   Nose: Nose normal.   Mouth/Throat: Oropharynx is clear and moist.   Eyes: EOM are normal.   Neck: No thyromegaly present.   Cardiovascular: Normal rate and regular rhythm.    Pulmonary/Chest: Effort normal and breath sounds normal.   Abdominal: Bowel sounds are normal.   Musculoskeletal: She exhibits no edema.   Lymphadenopathy:     She has no cervical adenopathy.   Skin: No rash noted.       Diagnostic Test Results:  Results for orders placed or performed in visit on 18   *UA reflex to Microscopic   Result Value Ref Range    Color Urine Yellow     Appearance Urine Cloudy     Glucose Urine Negative NEG^Negative mg/dL    Bilirubin Urine Negative NEG^Negative    Ketones Urine  Negative NEG^Negative mg/dL    Specific Gravity Urine 1.015 1.003 - 1.035    Blood Urine Large (A) NEG^Negative    pH Urine 6.0 5.0 - 7.0 pH    Protein Albumin Urine Trace (A) NEG^Negative mg/dL    Urobilinogen Urine 0.2 0.2 - 1.0 EU/dL    Nitrite Urine Positive (A) NEG^Negative    Leukocyte Esterase Urine Moderate (A) NEG^Negative    Source Midstream Urine        ASSESSMENT/PLAN:     Healthy 48-year-old man presents with 5 day history of fever, chills, malaise and mild dysuria.  Urinalysis consistent with UTI.  She has no flank pain, nausea or vomiting but certainly could have an early pyelonephritis.  Also concerned about possible tickborne illness.    ICD-10-CM    1. UTI symptoms R39.9 *UA reflex to Microscopic     Urine Microscopic     Urine Culture Aerobic Bacterial     doxycycline monohydrate 100 MG capsule   2. Fever, unspecified fever cause R50.9 Lyme Disease Ab with reflex to WB Serum     Ehrlichia Anaplasma Sp by PCR     CBC with platelets differential     Comprehensive metabolic panel     doxycycline monohydrate 100 MG capsule     Decided to cover with Rocephin 1 g IM.  Labs are pending in patient will be contacted later this evening or in the morning with these results.  Plan to start doxycycline 100 mg twice daily if labs indicate tickborne illness.  Otherwise will plan on starting Cipro.    Patient Instructions   Rocephin shot today for bladder infection, possible kidney infection  Start doxycycline tomorrow to cover for tick-borne illness  Will call with lab results tonight or first thing in morning and decide on duration of treatment      I spent over 25 minutes with the patient, greater than 50% was spent in counseling and coordination of care.    Tania Martinez MD  [facility] [place] [facility]

## 2018-07-26 NOTE — MR AVS SNAPSHOT
After Visit Summary   7/26/2018    Mary Gutierrez    MRN: [id number]           Patient Information     Date Of Birth          1970        Visit Information        Provider Department      7/26/2018 2:45 PM Tania Weathers MD [facility]        Today's Diagnoses     UTI symptoms    -  1    Fever, unspecified fever cause          Care Instructions    Rocephin shot today for bladder infection, possible kidney infection  Start doxycycline tomorrow to cover for tick-borne illness  Will call with lab results tonight or first thing in morning and decide on duration of treatment              Follow-ups after your visit        Your next 10 appointments already scheduled     Aug 15, 2018  9:30 AM CDT   LAB with GH LAB   [facility] ([facility])    [address]  [place] [address] [phone]           Please do not eat 10-12 hours before your appointment if you are coming in fasting for labs on lipids, cholesterol, or glucose (sugar). This does not apply to pregnant women. Water, hot tea and black coffee (with nothing added) are okay. Do not drink other fluids, diet soda or chew gum.              Who to contact     If you have questions or need follow up information about today's clinic visit or your schedule please contact [facility] directly at [phone].  Normal or non-critical lab and imaging results will be communicated to you by MyChart, letter or phone within 4 business days after the clinic has received the results. If you do not hear from us within 7 days, please contact the clinic through MyChart or phone. If you have a critical or abnormal lab result, we will notify you by phone as soon as possible.  Submit refill requests through [employer] or call your pharmacy and they will forward the refill request to us. Please allow 3 business days for your refill to be completed.          Additional  Information About Your Visit        [employer] Information     [employer] gives you secure access to your electronic health record. If you see a primary care provider, you can also send messages to your care team and make appointments. If you have questions, please call your primary care clinic.  If you do not have a primary care provider, please call [phone] and they will assist you.        Care EveryWhere ID     This is your Care EveryWhere ID. This could be used by other organizations to access your [facility] medical records  [id number]        Your Vitals Were     Pulse Temperature BMI (Body Mass Index)             129 99.9  F (37.7  C) 25.28 kg/m2          Blood Pressure from Last 3 Encounters:   07/26/18 114/68   07/20/18 120/62   06/22/18 142/88    Weight from Last 3 Encounters:   07/26/18 145 lb (65.8 kg)   07/20/18 144 lb 12.8 oz (65.7 kg)   06/22/18 144 lb 6.4 oz (65.5 kg)              We Performed the Following     *UA reflex to Microscopic     Ehrlichia Anaplasma Sp by PCR     Urine Culture Aerobic Bacterial     Urine Microscopic          Today's Medication Changes          These changes are accurate as of 7/26/18  2:57 PM.  If you have any questions, ask your nurse or doctor.               Start taking these medicines.        Dose/Directions    doxycycline monohydrate 100 MG capsule   Used for:  Fever, unspecified fever cause, UTI symptoms   Started by:  Tania Weathers MD        Dose:  100 mg   Take 1 capsule (100 mg) by mouth 2 times daily for 14 days   Quantity:  28 capsule   Refills:  0            Where to get your medicines      These medications were sent to [facility] - [place], [place] - [address]  [address], [facility] [address]     Phone:  [phone]     doxycycline monohydrate 100 MG capsule                Primary Care Provider Office Phone # Fax #    Jemima Mendez [phone] [address]  [place]  [place] [address]        Equal Access to Services     [facility]: Hadii aj banda andressa Forrester, wakvngda luqadaha, qaybta kaalmaomega tracey, tiffany bernsteinroxannthompson smith. So [facility] [phone].    ATENCIÓN: Si habla español, tiene a lemos disposición servicios gratuitos de asistencia lingüística. Llame al [phone].    We comply with applicable federal civil rights laws and [place] laws. We do not discriminate on the basis of race, color, national origin, age, disability, sex, sexual orientation, or gender identity.            Thank you!     Thank you for choosing [facility]  for your care. Our goal is always to provide you with excellent care. Hearing back from our patients is one way we can continue to improve our services. Please take a few minutes to complete the written survey that you may receive in the mail after your visit with us. Thank you!             Your Updated Medication List - Protect others around you: Learn how to safely use, store and throw away your medicines at www.disposemymeds.org.          This list is accurate as of 7/26/18  2:57 PM.  Always use your most recent med list.                   Brand Name Dispense Instructions for use Diagnosis    cyclobenzaprine 10 MG tablet    FLEXERIL    90 tablet    TAKE 1 TABLET BY MOUTH THREE TIMES DAILY    Episodic tension-type headache, not intractable       doxycycline monohydrate 100 MG capsule     28 capsule    Take 1 capsule (100 mg) by mouth 2 times daily for 14 days    Fever, unspecified fever cause, UTI symptoms       fish oil-omega-3 fatty acids 1000 MG capsule      Take by mouth daily        * HYDROcodone-acetaminophen 5-325 MG per tablet    NORCO    60 tablet    Take 1-2 tablets by mouth every 6 hours as needed for pain . Max acetaminophen dose: 4000mg in 24 hrs.    Episodic tension-type headache, not intractable       * HYDROcodone-acetaminophen 5-325 MG per tablet   Start taking on:  8/22/2018    NORCO    60 tablet     Take 1-2 tablets by mouth every 6 hours as needed for pain . Max acetaminophen dose: 4000mg in 24 hours.    Episodic tension-type headache, not intractable       lisinopril-hydrochlorothiazide 20-25 MG per tablet    PRINZIDE/ZESTORETIC    30 tablet    Take 1 tablet by mouth daily    Benign essential hypertension       MULTI-VITAMINS Tabs      Take 1 tablet by mouth daily        probiotic Caps           vitamin D3 1000 units Caps      Take 1,000 Units by mouth daily        * Notice:  This list has 2 medication(s) that are the same as other medications prescribed for you. Read the directions carefully, and ask your doctor or other care provider to review them with you.

## 2018-07-26 NOTE — NURSING NOTE
Patient is here for uti symptoms and fever. Started Sunday feeling tired fever started Tuesday, Monday started burning with urination.  Yana Chopra LPN .............7/26/2018     2:45 PM

## 2018-07-26 NOTE — PATIENT INSTRUCTIONS
Rocephin shot today for bladder infection, possible kidney infection  Start doxycycline tomorrow to cover for tick-borne illness  Will call with lab results tonight or first thing in morning and decide on duration of treatment

## 2018-07-28 ENCOUNTER — MYC MEDICAL ADVICE (OUTPATIENT)
Dept: FAMILY MEDICINE | Facility: OTHER | Age: 48
End: 2018-07-28

## 2018-07-28 LAB
BACTERIA SPEC CULT: ABNORMAL
INTERPRETATION MONITOR -MUSE: NORMAL
SPECIMEN SOURCE: ABNORMAL

## 2018-07-29 ENCOUNTER — APPOINTMENT (OUTPATIENT)
Dept: ULTRASOUND IMAGING | Facility: OTHER | Age: 48
End: 2018-07-29
Attending: EMERGENCY MEDICINE
Payer: COMMERCIAL

## 2018-07-29 ENCOUNTER — HOSPITAL ENCOUNTER (EMERGENCY)
Facility: OTHER | Age: 48
Discharge: HOME | End: 2018-07-29
Attending: EMERGENCY MEDICINE | Admitting: EMERGENCY MEDICINE
Payer: COMMERCIAL

## 2018-07-29 VITALS
BODY MASS INDEX: 24.8 KG/M2 | HEART RATE: 115 BPM | HEIGHT: 63 IN | DIASTOLIC BLOOD PRESSURE: 60 MMHG | SYSTOLIC BLOOD PRESSURE: 98 MMHG | RESPIRATION RATE: 16 BRPM | TEMPERATURE: 98.1 F | OXYGEN SATURATION: 98 % | WEIGHT: 140 LBS

## 2018-07-29 DIAGNOSIS — N39.0 URINARY TRACT INFECTION IN FEMALE: ICD-10-CM

## 2018-07-29 DIAGNOSIS — R50.9 FEBRILE ILLNESS, ACUTE: ICD-10-CM

## 2018-07-29 LAB
ALBUMIN SERPL-MCNC: 3.3 G/DL (ref 3.5–5.7)
ALBUMIN UR-MCNC: ABNORMAL MG/DL
ALP SERPL-CCNC: 113 U/L (ref 34–104)
ALT SERPL W P-5'-P-CCNC: 41 U/L (ref 7–52)
ANION GAP SERPL CALCULATED.3IONS-SCNC: 12 MMOL/L (ref 3–14)
APPEARANCE UR: CLEAR
AST SERPL W P-5'-P-CCNC: 35 U/L (ref 13–39)
BASOPHILS # BLD AUTO: 0 10E9/L (ref 0–0.2)
BASOPHILS NFR BLD AUTO: 0.5 %
BILIRUB SERPL-MCNC: 0.6 MG/DL (ref 0.3–1)
BILIRUB UR QL STRIP: ABNORMAL
BUN SERPL-MCNC: 19 MG/DL (ref 7–25)
CALCIUM SERPL-MCNC: 9.1 MG/DL (ref 8.6–10.3)
CHLORIDE SERPL-SCNC: 93 MMOL/L (ref 98–107)
CO2 SERPL-SCNC: 27 MMOL/L (ref 21–31)
COLOR UR AUTO: YELLOW
CREAT SERPL-MCNC: 1.08 MG/DL (ref 0.6–1.2)
DIFFERENTIAL METHOD BLD: ABNORMAL
EOSINOPHIL # BLD AUTO: 0.1 10E9/L (ref 0–0.7)
EOSINOPHIL NFR BLD AUTO: 0.8 %
ERYTHROCYTE [DISTWIDTH] IN BLOOD BY AUTOMATED COUNT: 13.2 % (ref 10–15)
GFR SERPL CREATININE-BSD FRML MDRD: 55 ML/MIN/1.7M2
GLUCOSE SERPL-MCNC: 151 MG/DL (ref 70–105)
GLUCOSE UR STRIP-MCNC: NEGATIVE MG/DL
HCG UR QL: NEGATIVE
HCT VFR BLD AUTO: 33.8 % (ref 35–47)
HGB BLD-MCNC: 11.5 G/DL (ref 11.7–15.7)
HGB UR QL STRIP: ABNORMAL
IMM GRANULOCYTES # BLD: 0.1 10E9/L (ref 0–0.4)
IMM GRANULOCYTES NFR BLD: 0.8 %
KETONES UR STRIP-MCNC: NEGATIVE MG/DL
LACTATE SERPL-SCNC: 1.1 MMOL/L (ref 0.5–2.2)
LEUKOCYTE ESTERASE UR QL STRIP: ABNORMAL
LYMPHOCYTES # BLD AUTO: 0.8 10E9/L (ref 0.8–5.3)
LYMPHOCYTES NFR BLD AUTO: 10.2 %
MCH RBC QN AUTO: 30.6 PG (ref 26.5–33)
MCHC RBC AUTO-ENTMCNC: 34 G/DL (ref 31.5–36.5)
MCV RBC AUTO: 90 FL (ref 78–100)
MONOCYTES # BLD AUTO: 1 10E9/L (ref 0–1.3)
MONOCYTES NFR BLD AUTO: 12.9 %
NEUTROPHILS # BLD AUTO: 5.8 10E9/L (ref 1.6–8.3)
NEUTROPHILS NFR BLD AUTO: 74.8 %
NITRATE UR QL: NEGATIVE
NON-SQ EPI CELLS #/AREA URNS LPF: NORMAL /LPF
PH UR STRIP: 7 PH (ref 5–9)
PLATELET # BLD AUTO: 227 10E9/L (ref 150–450)
POTASSIUM SERPL-SCNC: 2.9 MMOL/L (ref 3.5–5.1)
PROT SERPL-MCNC: 6.8 G/DL (ref 6.4–8.9)
RBC # BLD AUTO: 3.76 10E12/L (ref 3.8–5.2)
RBC #/AREA URNS AUTO: NORMAL /HPF
SODIUM SERPL-SCNC: 132 MMOL/L (ref 134–144)
SOURCE: ABNORMAL
SP GR UR STRIP: 1.01 (ref 1–1.03)
UROBILINOGEN UR STRIP-ACNC: 0.2 EU/DL (ref 0.2–1)
WBC # BLD AUTO: 7.7 10E9/L (ref 4–11)
WBC #/AREA URNS AUTO: NORMAL /HPF

## 2018-07-29 PROCEDURE — 85025 COMPLETE CBC W/AUTO DIFF WBC: CPT | Performed by: EMERGENCY MEDICINE

## 2018-07-29 PROCEDURE — 99285 EMERGENCY DEPT VISIT HI MDM: CPT | Mod: 25 | Performed by: EMERGENCY MEDICINE

## 2018-07-29 PROCEDURE — 96360 HYDRATION IV INFUSION INIT: CPT | Performed by: EMERGENCY MEDICINE

## 2018-07-29 PROCEDURE — 80053 COMPREHEN METABOLIC PANEL: CPT | Performed by: EMERGENCY MEDICINE

## 2018-07-29 PROCEDURE — 93010 ELECTROCARDIOGRAM REPORT: CPT | Performed by: INTERNAL MEDICINE

## 2018-07-29 PROCEDURE — 93005 ELECTROCARDIOGRAM TRACING: CPT | Performed by: EMERGENCY MEDICINE

## 2018-07-29 PROCEDURE — 36415 COLL VENOUS BLD VENIPUNCTURE: CPT | Performed by: EMERGENCY MEDICINE

## 2018-07-29 PROCEDURE — 81001 URINALYSIS AUTO W/SCOPE: CPT | Performed by: EMERGENCY MEDICINE

## 2018-07-29 PROCEDURE — 76770 US EXAM ABDO BACK WALL COMP: CPT

## 2018-07-29 PROCEDURE — 99284 EMERGENCY DEPT VISIT MOD MDM: CPT | Mod: Z6 | Performed by: EMERGENCY MEDICINE

## 2018-07-29 PROCEDURE — 87040 BLOOD CULTURE FOR BACTERIA: CPT | Mod: 91 | Performed by: EMERGENCY MEDICINE

## 2018-07-29 PROCEDURE — 81025 URINE PREGNANCY TEST: CPT | Performed by: EMERGENCY MEDICINE

## 2018-07-29 PROCEDURE — 83605 ASSAY OF LACTIC ACID: CPT | Performed by: EMERGENCY MEDICINE

## 2018-07-29 PROCEDURE — 25000128 H RX IP 250 OP 636: Performed by: EMERGENCY MEDICINE

## 2018-07-29 PROCEDURE — 87040 BLOOD CULTURE FOR BACTERIA: CPT | Performed by: EMERGENCY MEDICINE

## 2018-07-29 RX ADMIN — SODIUM CHLORIDE 1000 ML: 900 INJECTION, SOLUTION INTRAVENOUS at 11:36

## 2018-07-29 ASSESSMENT — ENCOUNTER SYMPTOMS
DIARRHEA: 0
HEADACHES: 1
LIGHT-HEADEDNESS: 0
VOMITING: 0
ABDOMINAL PAIN: 0
FATIGUE: 1
FEVER: 1
FLANK PAIN: 0
COUGH: 0
DYSURIA: 0
WEAKNESS: 1
NECK PAIN: 0
CHILLS: 1
FREQUENCY: 0
BACK PAIN: 0
SHORTNESS OF BREATH: 0
HEMATURIA: 0
NECK STIFFNESS: 0
NAUSEA: 0
DIZZINESS: 0

## 2018-07-29 NOTE — ED AVS SNAPSHOT
[facility]    [address]    [place] [address]    Phone:  [phone]    Fax:  [phone]                                       Mary Gutierrez   MRN: [id number]    Department:  [facility]   Date of Visit:  7/29/2018           After Visit Summary Signature Page     I have received my discharge instructions, and my questions have been answered. I have discussed any challenges I see with this plan with the nurse or doctor.    ..........................................................................................................................................  Patient/Patient Representative Signature      ..........................................................................................................................................  Patient Representative Print Name and Relationship to Patient    ..................................................               ................................................  Date                                            Time    ..........................................................................................................................................  Reviewed by Signature/Title    ...................................................              ..............................................  Date                                                            Time

## 2018-07-29 NOTE — DISCHARGE INSTRUCTIONS
1.  Continue antibiotics as before  2.  Follow-up with your primary care physician in the next 1-2 days for further checkup  3.  If having worsening symptoms return to ER

## 2018-07-29 NOTE — ED AVS SNAPSHOT
[facility]    [address] [address]    Phone:  [phone]    Fax:  [phone]                                       Mary Gutierrez   MRN: [id number]    Department:  [facility]   Date of Visit:  7/29/2018           Patient Information     Date Of Birth          1970        Your diagnoses for this visit were:     Febrile illness, acute     Urinary tract infection in female        You were seen by Jayce Cavanaugh MD.        Discharge Instructions       1.  Continue antibiotics as before  2.  Follow-up with your primary care physician in the next 1-2 days for further checkup  3.  If having worsening symptoms return to ER    Your next 10 appointments already scheduled     Aug 15, 2018  9:30 AM CDT   LAB with  LAB   [facility] ([facility])    [address] [place] [address] [phone]           Please do not eat 10-12 hours before your appointment if you are coming in fasting for labs on lipids, cholesterol, or glucose (sugar). This does not apply to pregnant women. Water, hot tea and black coffee (with nothing added) are okay. Do not drink other fluids, diet soda or chew gum.              24 Hour Appointment Hotline     To schedule an appointment at [facility], please call [phone]. If you don't have a family doctor or clinic, we will help you find one. [facility] are conveniently located to serve the needs of you and your family.           Review of your medicines      Our records show that you are taking the medicines listed below. If these are incorrect, please call your family doctor or clinic.        Dose / Directions Last dose taken    ciprofloxacin 500 MG tablet   Commonly known as:  CIPRO   Dose:  500 mg   Quantity:  14 tablet        Take 1 tablet (500 mg) by mouth 2 times daily   Refills:  0        cyclobenzaprine 10 MG tablet   Commonly known as:   FLEXERIL   Quantity:  90 tablet        TAKE 1 TABLET BY MOUTH THREE TIMES DAILY   Refills:  2        fish oil-omega-3 fatty acids 1000 MG capsule        Take by mouth daily   Refills:  0        HYDROcodone-acetaminophen 5-325 MG per tablet   Commonly known as:  NORCO   Dose:  1-2 tablet   Quantity:  60 tablet        Take 1-2 tablets by mouth every 6 hours as needed for pain . Max acetaminophen dose: 4000mg in 24 hrs.   Refills:  0        lisinopril-hydrochlorothiazide 20-25 MG per tablet   Commonly known as:  PRINZIDE/ZESTORETIC   Dose:  1 tablet   Quantity:  30 tablet        Take 1 tablet by mouth daily   Refills:  1        MULTI-VITAMINS Tabs   Dose:  1 tablet        Take 1 tablet by mouth daily   Refills:  0        probiotic Caps        Refills:  0        vitamin D3 1000 units Caps   Dose:  1000 Units        Take 1,000 Units by mouth daily   Refills:  0                Procedures and tests performed during your visit     Procedure/Test Number of Times Performed    *UA reflex to Microscopic 1    Blood culture 2    CBC with platelets differential 1    Comprehensive metabolic panel 1    EKG 12-lead, tracing only 1    HCG qualitative urine (UPT) 1    Lactic acid 1    US Renal Complete 1    Urine Microscopic 1      Orders Needing Specimen Collection     None      Pending Results     Date and Time Order Name Status Description    7/29/2018 1000 Blood culture In process     7/29/2018 1000 Blood culture In process             Pending Culture Results     Date and Time Order Name Status Description    7/29/2018 1000 Blood culture In process     7/29/2018 1000 Blood culture In process             Pending Results Instructions     If you had any lab results that were not finalized at the time of your Discharge, you can call the ED Lab Result RN at [phone]. You will be contacted by this team for any positive Lab results or changes in treatment. The nurses are available 7 days a week from 10A to 6:30P.  You can leave a  message 24 hours per day and they will return your call.        Thank you for choosing [facility]       Thank you for choosing [facility] for your care. Our goal is always to provide you with excellent care. Hearing back from our patients is one way we can continue to improve our services. Please take a few minutes to complete the written survey that you may receive in the mail after you visit with us. Thank you!        [employer] Information     [employer] gives you secure access to your electronic health record. If you see a primary care provider, you can also send messages to your care team and make appointments. If you have questions, please call your primary care clinic.  If you do not have a primary care provider, please call [phone] and they will assist you.        Care EveryWhere ID     This is your Care EveryWhere ID. This could be used by other organizations to access your [facility] medical records  [id number]        Equal Access to Services     ALAYNA QUINONES : Jass Forrester, shirley davison, tiffany marroquin. So [facility] [phone].    ATENCIÓN: Si habla español, tiene a lemos disposición servicios gratuitos de asistencia lingüística. Llame al [phone].    We comply with applicable federal civil rights laws and [place] laws. We do not discriminate on the basis of race, color, national origin, age, disability, sex, sexual orientation, or gender identity.            After Visit Summary       This is your record. Keep this with you and show to your community pharmacist(s) and doctor(s) at your next visit.                   BP uncontrolled. Will have HD today with 3 kg fluid removal. Monitor BP

## 2018-07-29 NOTE — ED PROVIDER NOTES
"  History   No chief complaint on file.    HPI Comments: This is a 48-year-old who has otherwise been healthy (except reported chronic asymptomatic mild tachycardia) who is coming to the emergency room with her  concerning about persistent headache in the back of the head extending into the upper part of the neck associated with extreme fatigue especially on minimal exertion with fogginess in her thoughts with patient stating \"my thoughts are not as crispy.\"  She also reports she has been having persistent fever as high as 103 and 104. Patient is states she gets intermittent headaches and the headache is having is not all that different in character and severity than the headache gets, it just that it is lasting little longer and was involved at the frontal part; the frontal headache is gone now.  Patient states about more than 2 weeks ago she started having urinary symptoms which resolved on their own but then came back on Monday last week for which she was seen by her primary care physician who ordered extensive lab workup including tickborne serum indicators and UA CBC and complete metabolic profile urine culture.  Patient's urine culture grew pansensitive E. coli.  Patient received at the clinic on Tuesday Rocephin 1 g and sent home with ciprofloxacin.  Patient's CBC reveals over 14,000 white count.  Patient continues to have the same symptoms persistent fever.  She denies abdominal or chest pains, cough, earache or sore throat or facial pains.  She denies groin/pelvic pain, vaginal bleeding or discharge.  Patient has spoken with her primary care physician earlier today who with ongoing symptoms told her to come into the emergency room.  She was started with doxycycline in addition to ciprofloxacin yesterday for possible tickborne disease.       Problem List:    Patient Active Problem List    Diagnosis Date Noted     Essential hypertension 07/20/2018     Priority: Medium     Episodic tension type headache " 2018     Priority: Medium     Hypercholesterolemia 2018     Priority: Medium     Acquired deformity of ankle and foot 2018     Priority: Medium     Other affections of shoulder region, not elsewhere classified 2011     Priority: Medium        Past Medical History:    Past Medical History:   Diagnosis Date     Abnormal finding of blood chemistry      Encounter for assisted reproductive fertility procedure cycle      Parvovirus infection      Personal history of other diseases of the female genital tract      Presence of (intrauterine) contraceptive device        Past Surgical History:    Past Surgical History:   Procedure Laterality Date      SECTION       section - breech/breech twins     COLONOSCOPY      ,[place], normal     OTHER SURGICAL HISTORY      18244,ORAL SURGERY,[place] teeth     OTHER SURGICAL HISTORY      [id number],OTHER,IVF       Family History:    Family History   Problem Relation Age of Onset     Other - See Comments Maternal Grandmother      Had two spontaneous losses     Ovarian Cancer Maternal Grandmother 82     Cancer-ovarian     Cancer Maternal Grandfather      Cancer,Had rectal cancer     Diabetes Other      Diabetes,Diabetes on maternal side.     Hyperlipidemia Mother      Hyperlipidemia,and CLL dx in 50s     Colon Cancer Father 60     Cancer-colon,colon and liver cancer       Social History:  Marital Status:  Unknown [6]  Social History   Substance Use Topics     Smoking status: Never Smoker     Smokeless tobacco: Never Used     Alcohol use Yes      Comment: Alcoholic Drinks/day: 1-2 glasses of wine in a week        Medications:      Cholecalciferol (VITAMIN D3) 1000 UNITS CAPS   ciprofloxacin (CIPRO) 500 MG tablet   cyclobenzaprine (FLEXERIL) 10 MG tablet   fish oil-omega-3 fatty acids 1000 MG capsule   HYDROcodone-acetaminophen (NORCO) 5-325 MG per tablet   Multiple Vitamin (MULTI-VITAMINS) TABS   probiotic CAPS   lisinopril-hydrochlorothiazide  "(PRINZIDE/ZESTORETIC) 20-25 MG per tablet         Review of Systems   Constitutional: Positive for chills, fatigue and fever.   Respiratory: Negative for cough and shortness of breath.    Cardiovascular: Negative for chest pain.   Gastrointestinal: Negative for abdominal pain, diarrhea, nausea and vomiting.   Genitourinary: Negative for dysuria, flank pain, frequency, hematuria, pelvic pain, urgency, vaginal bleeding, vaginal discharge and vaginal pain.   Musculoskeletal: Negative for back pain, neck pain and neck stiffness.   Neurological: Positive for weakness and headaches. Negative for dizziness and light-headedness.   All other systems reviewed and are negative.      Physical Exam   BP: 103/60  Pulse: 115  Temp: 98.1  F (36.7  C)  Resp: 16  Height: 160 cm (5' 3\")  Weight: 63.5 kg (140 lb)  SpO2: 98 %      Physical Exam   Constitutional: She is oriented to person, place, and time. She appears well-developed and well-nourished. No distress.   HENT:   Head: Normocephalic and atraumatic.   Neck: Normal range of motion. Neck supple.   Cardiovascular: Normal rate, regular rhythm, normal heart sounds and intact distal pulses.    Pulmonary/Chest: Effort normal and breath sounds normal. No respiratory distress. She has no wheezes. She has no rales. She exhibits no tenderness.   Abdominal: Soft. Bowel sounds are normal. She exhibits no distension. There is no tenderness. There is no rebound and no guarding.   Musculoskeletal: Normal range of motion. She exhibits no edema or tenderness.   Neurological: She is oriented to person, place, and time.   No focal neurologic findings on examining       ED Course     Patient is having febrile illness with recent diagnosis of urinary tract infection.  Today she does not appear toxic or in significant distress.  She said the headache has noimproved.  She does also mention she gets mild headaches similar to kind headache she is having the only difference being the location of the " "headache which is frontal this time.  On examination she has no neck stiffness or meningeal signs to suggest encephalitis or meningitis.  Her neck is supple.  CBC, UA lab results do not reveal acute findings or changes.  Complete metabolic profile with feels slightly elevated creatinine with diminished at GFR similar to lab results obtained at the clinic several days ago.  This is likely resulting from prerenal azotemia which the patient received a liter of normal saline IV bolus.  I do not believe patient's continuing fever and illness is related to urinary trac  Infection.  Patient's culture grew E. coli which ispansensitive including the ciprofloxacin which she is currently on and should control the infection.  In addition, UA and CBC lab results are normal. It is possible patient may have tickborne disease. She is on appropriate antibiotic for that, Doxycycline but she started the antibiotic only yesterday. The lab results for tickborne is currently pending so patient advised to continue the antibiotic after consultation with her provider, Dr. Martinez. It is possible patient may may be having acute viral illness of some kind as well. At this point she is advised to consult with her provider in 1-2 days for further check up or return to ER for worsening symptoms. Dr. Martinez recommended abd US to assess possibility of renal abscess which there is none based on US today. EKG reveal sinus tachycardia without ST changes. No AV blocks seen.. Patient has chronic sinus tachycardia. According to her recent Holter Monitor interpretation she had \"Normal sinus rhythm  No ectopy. Rate ranged from 60 to 152 with average of 99. 62% of beats were greater than or equal to 100 bpm.      ED Course     Procedures               Critical Care time:  none               Results for orders placed or performed during the hospital encounter of 07/29/18 (from the past 24 hour(s))   *UA reflex to Microscopic   Result Value Ref Range    " Color Urine Yellow     Appearance Urine Clear     Glucose Urine Negative NEG^Negative mg/dL    Bilirubin Urine Small (A) NEG^Negative    Ketones Urine Negative NEG^Negative mg/dL    Specific Gravity Urine 1.010 1.000 - 1.030    Blood Urine Small (A) NEG^Negative    pH Urine 7.0 5.0 - 9.0 pH    Protein Albumin Urine Trace (A) NEG^Negative mg/dL    Urobilinogen Urine 0.2 0.2 - 1.0 EU/dL    Nitrite Urine Negative NEG^Negative    Leukocyte Esterase Urine Trace (A) NEG^Negative    Source Midstream Urine    HCG qualitative urine (UPT)   Result Value Ref Range    HCG Qual Urine Negative NEG^Negative   Urine Microscopic   Result Value Ref Range    WBC Urine 0 - 5 OTO5^0 - 5 /HPF    RBC Urine O - 2 OTO2^O - 2 /HPF    Squamous Epithelial /LPF Urine Few FEW^Few /LPF   CBC with platelets differential   Result Value Ref Range    WBC 7.7 4.0 - 11.0 10e9/L    RBC Count 3.76 (L) 3.8 - 5.2 10e12/L    Hemoglobin 11.5 (L) 11.7 - 15.7 g/dL    Hematocrit 33.8 (L) 35.0 - 47.0 %    MCV 90 78 - 100 fl    MCH 30.6 26.5 - 33.0 pg    MCHC 34.0 31.5 - 36.5 g/dL    RDW 13.2 10.0 - 15.0 %    Platelet Count 227 150 - 450 10e9/L    Diff Method Automated Method     % Neutrophils 74.8 %    % Lymphocytes 10.2 %    % Monocytes 12.9 %    % Eosinophils 0.8 %    % Basophils 0.5 %    % Immature Granulocytes 0.8 %    Absolute Neutrophil 5.8 1.6 - 8.3 10e9/L    Absolute Lymphocytes 0.8 0.8 - 5.3 10e9/L    Absolute Monocytes 1.0 0.0 - 1.3 10e9/L    Absolute Eosinophils 0.1 0.0 - 0.7 10e9/L    Absolute Basophils 0.0 0.0 - 0.2 10e9/L    Abs Immature Granulocytes 0.1 0 - 0.4 10e9/L   Comprehensive metabolic panel   Result Value Ref Range    Sodium 132 (L) 134 - 144 mmol/L    Potassium 2.9 (L) 3.5 - 5.1 mmol/L    Chloride 93 (L) 98 - 107 mmol/L    Carbon Dioxide 27 21 - 31 mmol/L    Anion Gap 12 3 - 14 mmol/L    Glucose 151 (H) 70 - 105 mg/dL    Urea Nitrogen 19 7 - 25 mg/dL    Creatinine 1.08 0.60 - 1.20 mg/dL    GFR Estimate 55 (L) >60 mL/min/1.7m2    GFR  Estimate If Black 66 >60 mL/min/1.7m2    Calcium 9.1 8.6 - 10.3 mg/dL    Bilirubin Total 0.6 0.3 - 1.0 mg/dL    Albumin 3.3 (L) 3.5 - 5.7 g/dL    Protein Total 6.8 6.4 - 8.9 g/dL    Alkaline Phosphatase 113 (H) 34 - 104 U/L    ALT 41 7 - 52 U/L    AST 35 13 - 39 U/L   Lactic acid   Result Value Ref Range    Lactic Acid 1.1 0.5 - 2.2 mmol/L   US Renal Complete    Narrative    PROCEDURE: US RENAL COMPLETE  7/29/2018 12:08 PM    HISTORY:Female, age 48 years, . consider renal abscess;     TECHNIQUE:  A renal ultrasound was performed.    COMPARISON:  None.    MEASUREMENTS:    Right renal length: 10.0 cm  Left renal length: 11.7 cm    RENAL FINDINGS: Normal kidneys.    BLADDER: Normal.      Impression    IMPRESSION:  Normal examination.      JEANNE SUGGS MD       Medications   0.9% sodium chloride BOLUS (0 mLs Intravenous Stopped 7/29/18 1247)       Assessments & Plan (with Medical Decision Making)     I have reviewed the nursing notes.    I have reviewed the findings, diagnosis, plan and need for follow up with the patient.       Discharge Medication List as of 7/29/2018 12:47 PM          Final diagnoses:   Febrile illness, acute   Urinary tract infection in female       7/29/2018   [facility] [place]     Jayce Cavanaugh MD  07/29/18 1300

## 2018-07-30 ENCOUNTER — OFFICE VISIT (OUTPATIENT)
Dept: FAMILY MEDICINE | Facility: OTHER | Age: 48
End: 2018-07-30
Attending: FAMILY MEDICINE
Payer: COMMERCIAL

## 2018-07-30 VITALS
BODY MASS INDEX: 25.61 KG/M2 | TEMPERATURE: 98 F | WEIGHT: 144.6 LBS | SYSTOLIC BLOOD PRESSURE: 112 MMHG | DIASTOLIC BLOOD PRESSURE: 76 MMHG

## 2018-07-30 DIAGNOSIS — B88.2 TICK-BORNE DISEASE: Primary | ICD-10-CM

## 2018-07-30 DIAGNOSIS — I10 BENIGN ESSENTIAL HYPERTENSION: ICD-10-CM

## 2018-07-30 LAB — B BURGDOR IGG+IGM SER QL: 0.1 (ref 0–0.89)

## 2018-07-30 PROCEDURE — 99214 OFFICE O/P EST MOD 30 MIN: CPT | Performed by: FAMILY MEDICINE

## 2018-07-30 RX ORDER — DOXYCYCLINE 100 MG/1
100 CAPSULE ORAL 2 TIMES DAILY
Qty: 14 CAPSULE | Refills: 0 | Status: SHIPPED | OUTPATIENT
Start: 2018-07-30 | End: 2018-11-02

## 2018-07-30 RX ORDER — DOXYCYCLINE 100 MG/1
1 CAPSULE ORAL 2 TIMES DAILY
Refills: 0 | COMMUNITY
Start: 2018-07-26 | End: 2018-07-30

## 2018-07-30 NOTE — PROGRESS NOTES
SUBJECTIVE:   Mary Gutierrez is a 48 year old female who presents to clinic today for the following health issues:    HPI Comments: Patient presents to follow-up on an ongoing febrile illness.  She did have an abnormal urine culture, pansensitive E. coli, on Cipro.  However, symptoms began to be more suspicious for a tick borne illness.  She [place] in a tick endemic area and recently traveled to [place] which has an even higher incidence of tick disease.  We did do some hiking while on that trip.  Because of concern, doxycycline was started 2 days ago, just today the patient notes that she has felt significantly better.  No other signs or symptoms concerning for pyelonephritis.  No concerns for meningeal symptoms.  Lyme titer and Ehrlichia Anaplasma panel were drawn and are pending, results may be available tomorrow.  However, discussed with the patient limitations associated with these tests.  Would recommend extending doxycycline course to 3 weeks.  She completed 4 days of ciprofloxacin, advised to discontinue as bladder infection has been treated.  Reviewed recent Holter monitor results with evidence of sinus tachycardia.  Discussed switching to a beta-blocker for management of her blood pressure, but no changes made today.      Review of Systems see HPI, review of systems is otherwise negative.    OBJECTIVE:     /76 (BP Location: Right arm, Patient Position: Sitting, Cuff Size: Adult Regular)  Temp 98  F (36.7  C) (Tympanic)  Wt 144 lb 9.6 oz (65.6 kg)  BMI 25.61 kg/m2  Body mass index is 25.61 kg/(m^2).  Physical Exam   Constitutional: She is oriented to person, place, and time. She appears well-developed and well-nourished.   Appears fatigued, but nontoxic.   HENT:   TMs appear normal.    Eyes: Conjunctivae are normal.   Neck: No thyromegaly present.   Cardiovascular: Normal rate, regular rhythm and normal heart sounds.    No murmur heard.  Pulmonary/Chest: Effort normal and breath sounds  normal. No respiratory distress.   Abdominal: Soft.   Musculoskeletal: She exhibits no edema.   Lymphadenopathy:     She has no cervical adenopathy.   Neurological: She is alert and oriented to person, place, and time.   Skin: No rash noted.   Psychiatric: She has a normal mood and affect.       ASSESSMENT/PLAN:         ICD-10-CM    1. Tick-borne disease B88.2 doxycycline monohydrate 100 MG capsule     Patient has had notable improvement with initiation of doxycycline.  Labs pending, but discussed the likelihood of tickborne disease.  Would recommend ongoing rest, symptomatic management.  Will be in touch with the patient with results when available. Follow-up with additional concerns or symptoms.    Jemima Mendez MD  [facility] AND [facility]

## 2018-07-30 NOTE — MR AVS SNAPSHOT
After Visit Summary   7/30/2018    Mary Gutierrez    MRN: [id number]           Patient Information     Date Of Birth          1970        Visit Information        Provider Department      7/30/2018 10:30 AM Jemima Mendez MD [facility]        Today's Diagnoses     Tick-borne disease    -  1       Follow-ups after your visit        Your next 10 appointments already scheduled     Aug 15, 2018  9:30 AM CDT   LAB with GH LAB   [facility] ([facility])    [address]  [place] [address] [phone]           Please do not eat 10-12 hours before your appointment if you are coming in fasting for labs on lipids, cholesterol, or glucose (sugar). This does not apply to pregnant women. Water, hot tea and black coffee (with nothing added) are okay. Do not drink other fluids, diet soda or chew gum.              Who to contact     If you have questions or need follow up information about today's clinic visit or your schedule please contact [employer] directly at [phone].  Normal or non-critical lab and imaging results will be communicated to you by [employer], letter or phone within 4 business days after the clinic has received the results. If you do not hear from us within 7 days, please contact the clinic through [employer] or phone. If you have a critical or abnormal lab result, we will notify you by phone as soon as possible.  Submit refill requests through [employer] or call your pharmacy and they will forward the refill request to us. Please allow 3 business days for your refill to be completed.          Additional Information About Your Visit        [employer] Information     [employer] gives you secure access to your electronic health record. If you see a primary care provider, you can also send messages to your care team and make appointments. If you have questions, please call your primary care clinic.  If  you do not have a primary care provider, please call [phone] and they will assist you.        Care EveryWhere ID     This is your Care EveryWhere ID. This could be used by other organizations to access your [facility] medical records  [id number]        Your Vitals Were     Temperature BMI (Body Mass Index)                98  F (36.7  C) (Tympanic) 25.61 kg/m2           Blood Pressure from Last 3 Encounters:   07/30/18 112/76   07/29/18 98/60   07/26/18 114/68    Weight from Last 3 Encounters:   07/30/18 144 lb 9.6 oz (65.6 kg)   07/29/18 140 lb (63.5 kg)   07/26/18 145 lb (65.8 kg)              Today, you had the following     No orders found for display         Where to get your medicines      These medications were sent to [facility] - [place], [place] - [address]  [address], [facility] [address]     Phone:  [phone]     doxycycline monohydrate 100 MG capsule          Primary Care Provider Office Phone # Fax #    Jemima Mendez [phone]       [address]  [facility] [address]        Equal Access to Services     ALAYNA QUINONES AH: Hadii aj banda hadasho Soomaali, waaxda luqadaha, qaybta kaalmada adeegyada, tiffany smith. So [facility] [phone].    ATENCIÓN: Si habla español, tiene a lemos disposición servicios gratuitos de asistencia lingüística. Llame al [phone].    We comply with applicable federal civil rights laws and [place] laws. We do not discriminate on the basis of race, color, national origin, age, disability, sex, sexual orientation, or gender identity.            Thank you!     Thank you for choosing [facility] AND [facility]  for your care. Our goal is always to provide you with excellent care. Hearing back from our patients is one way we can continue to improve our services. Please take a few minutes to complete the written survey that you may receive in the mail after your visit with us.  Thank you!             Your Updated Medication List - Protect others around you: Learn how to safely use, store and throw away your medicines at www.disposemymeds.org.          This list is accurate as of 7/30/18 12:39 PM.  Always use your most recent med list.                   Brand Name Dispense Instructions for use Diagnosis    ciprofloxacin 500 MG tablet    CIPRO    14 tablet    Take 1 tablet (500 mg) by mouth 2 times daily    UTI symptoms       cyclobenzaprine 10 MG tablet    FLEXERIL    90 tablet    TAKE 1 TABLET BY MOUTH THREE TIMES DAILY    Episodic tension-type headache, not intractable       doxycycline monohydrate 100 MG capsule     14 capsule    Take 1 capsule (100 mg) by mouth 2 times daily    Tick-borne disease       fish oil-omega-3 fatty acids 1000 MG capsule      Take by mouth daily        HYDROcodone-acetaminophen 5-325 MG per tablet    NORCO    60 tablet    Take 1-2 tablets by mouth every 6 hours as needed for pain . Max acetaminophen dose: 4000mg in 24 hrs.    Episodic tension-type headache, not intractable       lisinopril-hydrochlorothiazide 20-25 MG per tablet    PRINZIDE/ZESTORETIC    30 tablet    Take 1 tablet by mouth daily    Benign essential hypertension       MULTI-VITAMINS Tabs      Take 1 tablet by mouth daily        probiotic Caps           vitamin D3 1000 units Caps      Take 1,000 Units by mouth daily

## 2018-07-31 LAB
A PHAGOCYTOPH DNA BLD QL NAA+PROBE: NOT DETECTED
E CHAFFEENSIS DNA BLD QL NAA+PROBE: NOT DETECTED
E EWINGII DNA SPEC QL NAA+PROBE: NOT DETECTED
EHRLICHIA DNA SPEC QL NAA+PROBE: NOT DETECTED

## 2018-08-01 RX ORDER — LISINOPRIL AND HYDROCHLOROTHIAZIDE 20; 25 MG/1; MG/1
TABLET ORAL
Qty: 30 TABLET | OUTPATIENT
Start: 2018-08-01

## 2018-08-01 NOTE — TELEPHONE ENCOUNTER
Filled 07/02/18 #30 x 1. Due 09/02/18. Pharmacy alerted. Unable to complete prescription refill per RNMedication Refill Policy.................... Mohini Croft ....................  8/1/2018   12:25 PM      07/20/2018 OV-Continue with lisinopril/HCTZ. Follow up mid August for labs. Will contact patient with holter results.     lisinopril-hydrochlorothiazide (PRINZIDE/ZESTORETIC) 20-25 MG per tablet 30 tablet 1 7/2/2018  --   Sig - Route: Take 1 tablet by mouth daily - Oral   Class: E-Prescribe   Order: [id number]   E-Prescribing Status: Receipt confirmed by pharmacy (7/2/2018  2:01 PM CDT     Unable to complete prescription refill per RNMedication Refill Policy.................... Mohini Croft ....................  8/1/2018   12:26 PM

## 2018-08-04 LAB
BACTERIA SPEC CULT: NORMAL
BACTERIA SPEC CULT: NORMAL
SPECIMEN SOURCE: NORMAL
SPECIMEN SOURCE: NORMAL

## 2018-08-24 DIAGNOSIS — I10 BENIGN ESSENTIAL HYPERTENSION: ICD-10-CM

## 2018-08-24 NOTE — TELEPHONE ENCOUNTER
Patient now at [facility], needs a new refill.  Margy Metcalf LPN ...... 8/24/2018 12:23 PM

## 2018-08-27 RX ORDER — LISINOPRIL AND HYDROCHLOROTHIAZIDE 20; 25 MG/1; MG/1
1 TABLET ORAL DAILY
Qty: 90 TABLET | Refills: 3 | Status: SHIPPED | OUTPATIENT
Start: 2018-08-27 | End: 2019-10-11

## 2018-08-27 NOTE — TELEPHONE ENCOUNTER
Prinzide  LOV-07/30/2018  Routing refill request to provider for review/approval because:  Labs out of range:    Normal serum potassium on file in past 12 months           Recent Labs   Lab Test  07/29/18   1010   POTASSIUM  2.9*                            Normal serum sodium on file in past 12 months           Recent Labs   Lab Test  07/29/18   1010   NA  132*              Unable to complete prescription refill per RNMedication Refill Policy.................... Mohini Croft ....................  8/27/2018   4:12 PM

## 2018-08-27 NOTE — TELEPHONE ENCOUNTER
Call patient. Medications refilled. But did have some mildly abnormal labs during recent illness, and we need to make sure these have returned to normal. Orders placed. She can do a lab only visit at her convenience. AET

## 2018-09-06 DIAGNOSIS — I10 BENIGN ESSENTIAL HYPERTENSION: ICD-10-CM

## 2018-09-06 LAB
ANION GAP SERPL CALCULATED.3IONS-SCNC: 8 MMOL/L (ref 3–14)
BUN SERPL-MCNC: 22 MG/DL (ref 7–25)
CALCIUM SERPL-MCNC: 10.2 MG/DL (ref 8.6–10.3)
CHLORIDE SERPL-SCNC: 100 MMOL/L (ref 98–107)
CO2 SERPL-SCNC: 30 MMOL/L (ref 21–31)
CREAT SERPL-MCNC: 1.01 MG/DL (ref 0.6–1.2)
ERYTHROCYTE [DISTWIDTH] IN BLOOD BY AUTOMATED COUNT: 13.4 % (ref 10–15)
GFR SERPL CREATININE-BSD FRML MDRD: 59 ML/MIN/1.7M2
GLUCOSE SERPL-MCNC: 90 MG/DL (ref 70–105)
HCT VFR BLD AUTO: 38.2 % (ref 35–47)
HGB BLD-MCNC: 13.1 G/DL (ref 11.7–15.7)
MCH RBC QN AUTO: 31.7 PG (ref 26.5–33)
MCHC RBC AUTO-ENTMCNC: 34.3 G/DL (ref 31.5–36.5)
MCV RBC AUTO: 93 FL (ref 78–100)
PLATELET # BLD AUTO: 287 10E9/L (ref 150–450)
POTASSIUM SERPL-SCNC: 3.9 MMOL/L (ref 3.5–5.1)
RBC # BLD AUTO: 4.13 10E12/L (ref 3.8–5.2)
SODIUM SERPL-SCNC: 138 MMOL/L (ref 134–144)
WBC # BLD AUTO: 8.7 10E9/L (ref 4–11)

## 2018-09-06 PROCEDURE — 85027 COMPLETE CBC AUTOMATED: CPT | Performed by: FAMILY MEDICINE

## 2018-09-06 PROCEDURE — 36415 COLL VENOUS BLD VENIPUNCTURE: CPT | Performed by: FAMILY MEDICINE

## 2018-09-06 PROCEDURE — 80048 BASIC METABOLIC PNL TOTAL CA: CPT | Performed by: FAMILY MEDICINE

## 2018-11-02 ENCOUNTER — OFFICE VISIT (OUTPATIENT)
Dept: FAMILY MEDICINE | Facility: OTHER | Age: 48
End: 2018-11-02
Attending: FAMILY MEDICINE
Payer: COMMERCIAL

## 2018-11-02 VITALS
SYSTOLIC BLOOD PRESSURE: 112 MMHG | HEIGHT: 64 IN | BODY MASS INDEX: 24.89 KG/M2 | DIASTOLIC BLOOD PRESSURE: 72 MMHG | HEART RATE: 64 BPM | WEIGHT: 145.8 LBS

## 2018-11-02 DIAGNOSIS — Z01.818 PREOP GENERAL PHYSICAL EXAM: Primary | ICD-10-CM

## 2018-11-02 DIAGNOSIS — Z23 NEED FOR INFLUENZA VACCINATION: ICD-10-CM

## 2018-11-02 DIAGNOSIS — I10 ESSENTIAL HYPERTENSION: ICD-10-CM

## 2018-11-02 DIAGNOSIS — G44.219 EPISODIC TENSION-TYPE HEADACHE, NOT INTRACTABLE: ICD-10-CM

## 2018-11-02 DIAGNOSIS — L30.9 HAND DERMATITIS: ICD-10-CM

## 2018-11-02 PROCEDURE — 90471 IMMUNIZATION ADMIN: CPT | Performed by: FAMILY MEDICINE

## 2018-11-02 PROCEDURE — 90686 IIV4 VACC NO PRSV 0.5 ML IM: CPT | Performed by: FAMILY MEDICINE

## 2018-11-02 PROCEDURE — 99214 OFFICE O/P EST MOD 30 MIN: CPT | Mod: 25 | Performed by: FAMILY MEDICINE

## 2018-11-02 RX ORDER — HYDROCODONE BITARTRATE AND ACETAMINOPHEN 5; 325 MG/1; MG/1
1-2 TABLET ORAL EVERY 6 HOURS PRN
Qty: 60 TABLET | Refills: 0 | Status: SHIPPED | OUTPATIENT
Start: 2019-01-02 | End: 2019-10-11

## 2018-11-02 RX ORDER — TRIAMCINOLONE ACETONIDE 5 MG/G
CREAM TOPICAL
Qty: 30 G | Refills: 0 | Status: SHIPPED | OUTPATIENT
Start: 2018-11-02 | End: 2019-10-11

## 2018-11-02 RX ORDER — MULTIVITAMIN WITH IRON
1 TABLET ORAL DAILY
COMMUNITY
End: 2024-04-02

## 2018-11-02 RX ORDER — HYDROCODONE BITARTRATE AND ACETAMINOPHEN 5; 325 MG/1; MG/1
1-2 TABLET ORAL EVERY 6 HOURS PRN
Qty: 60 TABLET | Refills: 0 | Status: SHIPPED | OUTPATIENT
Start: 2018-12-02 | End: 2018-11-02

## 2018-11-02 RX ORDER — HYDROCODONE BITARTRATE AND ACETAMINOPHEN 5; 325 MG/1; MG/1
1-2 TABLET ORAL EVERY 6 HOURS PRN
Qty: 60 TABLET | Refills: 0 | Status: SHIPPED | OUTPATIENT
Start: 2018-11-02 | End: 2018-11-02

## 2018-11-02 ASSESSMENT — PAIN SCALES - GENERAL: PAINLEVEL: NO PAIN (0)

## 2018-11-02 NOTE — PROGRESS NOTES

## 2018-11-02 NOTE — MR AVS SNAPSHOT
After Visit Summary   11/2/2018    Mary Gutierrez    MRN: [id number]           Patient Information     Date Of Birth          1970        Visit Information        Provider Department      11/2/2018 8:30 AM Jemima Mendez MD [facility]        Today's Diagnoses     Preop general physical exam    -  1    Episodic tension-type headache, not intractable        Need for influenza vaccination        Essential hypertension        Hand dermatitis          Care Instructions      Before Your Surgery      Call your surgeon if there is any change in your health. This includes signs of a cold or flu (such as a sore throat, runny nose, cough, rash or fever).    Do not smoke, drink alcohol or take over the counter medicine (unless your surgeon or primary care doctor tells you to) for the 24 hours before and after surgery.    If you take prescribed drugs: Follow your doctor s orders about which medicines to take and which to stop until after surgery.    Eating and drinking prior to surgery: follow the instructions from your surgeon    Take a shower or bath the night before surgery. Use the soap your surgeon gave you to gently clean your skin. If you do not have soap from your surgeon, use your regular soap. Do not shave or scrub the surgery site.  Wear clean pajamas and have clean sheets on your bed.           Follow-ups after your visit        Who to contact     If you have questions or need follow up information about today's clinic visit or your schedule please contact [facility] [place] [facility]\"" directly at [phone].  Normal or non-critical lab and imaging results will be communicated to you by MyChart, letter or phone within 4 business days after the clinic has received the results. If you do not hear from us within 7 days, please contact the clinic through MyChart or phone. If you have a critical or abnormal lab result, we will notify you by phone as soon as possible.  Submit  "refill requests through [employer] or call your pharmacy and they will forward the refill request to us. Please allow 3 business days for your refill to be completed.          Additional Information About Your Visit        [employer] Information     [employer] gives you secure access to your electronic health record. If you see a primary care provider, you can also send messages to your care team and make appointments. If you have questions, please call your primary care clinic.  If you do not have a primary care provider, please call [phone] and they will assist you.        Care EveryWhere ID     This is your Care EveryWhere ID. This could be used by other organizations to access your [facility] records  [id number]        Your Vitals Were     Pulse Height BMI (Body Mass Index)             64 5' 3.5\" (1.613 m) 25.42 kg/m2          Blood Pressure from Last 3 Encounters:   11/02/18 112/72   07/30/18 112/76   07/29/18 98/60    Weight from Last 3 Encounters:   11/02/18 145 lb 12.8 oz (66.1 kg)   07/30/18 144 lb 9.6 oz (65.6 kg)   07/29/18 140 lb (63.5 kg)              We Performed the Following     [facility]-   FLU VAC PRESRV FREE QUAD SPLIT VIR 3+YRS IM          Today's Medication Changes          These changes are accurate as of 11/2/18  5:19 PM.  If you have any questions, ask your nurse or doctor.               Start taking these medicines.        Dose/Directions    HYDROcodone-acetaminophen 5-325 MG per tablet   Commonly known as:  NORCO   Used for:  Episodic tension-type headache, not intractable   Started by:  Jemima Mendez MD        Dose:  1-2 tablet   Start taking on:  1/2/2019   Take 1-2 tablets by mouth every 6 hours as needed for pain . Max acetaminophen dose: 4000mg in 24 hrs.   Quantity:  60 tablet   Refills:  0       triamcinolone 0.5 % cream   Commonly known as:  KENALOG   Used for:  Hand dermatitis   Started by:  Jemima Mendez MD        Apply sparingly to affected area three times daily. "   Quantity:  30 g   Refills:  0         Stop taking these medicines if you haven't already. Please contact your care team if you have questions.     ciprofloxacin 500 MG tablet   Commonly known as:  CIPRO   Stopped by:  Jemima Mendez MD           doxycycline monohydrate 100 MG capsule   Stopped by:  Jemima Mendez MD           MULTI-VITAMINS Tabs   Stopped by:  Jemima Mendez MD                Where to get your medicines      These medications were sent to [id number] ([place]) - [place], [place] - [address]  [address], [facility] [address]     Phone:  [phone]     triamcinolone 0.5 % cream         Some of these will need a paper prescription and others can be bought over the counter.  Ask your nurse if you have questions.     Bring a paper prescription for each of these medications     HYDROcodone-acetaminophen 5-325 MG per tablet               Information about OPIOIDS     PRESCRIPTION OPIOIDS: WHAT YOU NEED TO KNOW   We gave you an opioid (narcotic) pain medicine. It is important to manage your pain, but opioids are not always the best choice. You should first try all the other options your care team gave you. Take this medicine for as short a time (and as few doses) as possible.    Some activities can increase your pain, such as bandage changes or therapy sessions. It may help to take your pain medicine 30 to 60 minutes before these activities. Reduce your stress by getting enough sleep, working on hobbies you enjoy and practicing relaxation or meditation. Talk to your care team about ways to manage your pain beyond prescription opioids.    These medicines have risks:    DO NOT drive when on new or higher doses of pain medicine. These medicines can affect your alertness and reaction times, and you could be arrested for driving under the influence (DUI). If you need to use opioids long-term, talk to your care team about driving.    DO NOT operate heavy machinery    DO  NOT do any other dangerous activities while taking these medicines.    DO NOT drink any alcohol while taking these medicines.     If the opioid prescribed includes acetaminophen, DO NOT take with any other medicines that contain acetaminophen. Read all labels carefully. Look for the word  acetaminophen  or  Tylenol.  Ask your pharmacist if you have questions or are unsure.    You can get addicted to pain medicines, especially if you have a history of addiction (chemical, alcohol or substance dependence). Talk to your care team about ways to reduce this risk.    All opioids tend to cause constipation. Drink plenty of water and eat foods that have a lot of fiber, such as fruits, vegetables, prune juice, apple juice and high-fiber cereal. Take a laxative (Miralax, milk of magnesia, Colace, Senna) if you don t move your bowels at least every other day. Other side effects include upset stomach, sleepiness, dizziness, throwing up, tolerance (needing more of the medicine to have the same effect), physical dependence and slowed breathing.    Store your pills in a secure place, locked if possible. We will not replace any lost or stolen medicine. If you don t finish your medicine, please throw away (dispose) as directed by your pharmacist. The [place] [employer]ution [employer] has more information about safe disposal: [email or web address].[facility].[place].us/living-[email or web address]/[email or web address]-unwanted-medications         Primary Care Provider Office Phone # Fax #    Jemima Mendez [phone] [address] [facility] [address]        Equal Access to Services     KAMALA QUINONES : Hadii aj Forrester, waaxda saman, qaybta kaalmada kyung, tiffany smith. So [facility] [phone].    ATENCIÓN: Si habla español, tiene a lemos disposición servicios gratuitos de asistencia lingüística. Llame al [phone].    We comply with applicable federal civil rights laws and [place] laws. We do not  discriminate on the basis of race, color, national origin, age, disability, sex, sexual orientation, or gender identity.            Thank you!     Thank you for choosing [facility]  for your care. Our goal is always to provide you with excellent care. Hearing back from our patients is one way we can continue to improve our services. Please take a few minutes to complete the written survey that you may receive in the mail after your visit with us. Thank you!             Your Updated Medication List - Protect others around you: Learn how to safely use, store and throw away your medicines at www.disposemymeds.org.          This list is accurate as of 11/2/18  5:19 PM.  Always use your most recent med list.                   Brand Name Dispense Instructions for use Diagnosis    cyclobenzaprine 10 MG tablet    FLEXERIL    90 tablet    TAKE 1 TABLET BY MOUTH THREE TIMES DAILY    Episodic tension-type headache, not intractable       doxylamine 25 MG Tabs tablet    UNISOM     Take 25 mg by mouth nightly as needed        fish oil-omega-3 fatty acids 1000 MG capsule      Take by mouth daily        HYDROcodone-acetaminophen 5-325 MG per tablet   Start taking on:  1/2/2019    NORCO    60 tablet    Take 1-2 tablets by mouth every 6 hours as needed for pain . Max acetaminophen dose: 4000mg in 24 hrs.    Episodic tension-type headache, not intractable       L-LYSINE HCL PO      Take 500 mg by mouth        lisinopril-hydrochlorothiazide 20-25 MG per tablet    PRINZIDE/ZESTORETIC    90 tablet    Take 1 tablet by mouth daily    Benign essential hypertension       magnesium 250 MG tablet      Take 1 tablet by mouth daily        probiotic Caps           triamcinolone 0.5 % cream    KENALOG    30 g    Apply sparingly to affected area three times daily.    Hand dermatitis       vitamin D3 1000 units Caps      Take 1,000 Units by mouth daily

## 2018-11-02 NOTE — PROGRESS NOTES
[facility] [place]  [address]  [place] [address]  [phone]    PRE-OP EVALUATION:  Today's date: 2018    Mary Gutierrez (: 1970) presents for pre-operative evaluation assessment as requested by Dr. Fair.  She requires evaluation and anesthesia risk assessment prior to undergoing surgery/procedure for treatment of Colonoscopy .    Proposed Surgery/ Procedure: colonoscopy  Date of Surgery/ Procedure: 18  Time of Surgery/ Procedure: ?  Hospital/Surgical Facility: [facility] office  Primary Physician: Jemima Mendez  Type of Anesthesia Anticipated: sedation    Patient has a Health Care Directive or Living Will:  YES but not on file here.     1. NO - Do you have a history of heart attack, stroke, stent, bypass or surgery on an artery in the head, neck, heart or legs?  2. NO - Do you ever have any pain or discomfort in your chest?  3. NO - Do you have a history of  Heart Failure?  4. NO - Are you troubled by shortness of breath when: walking on the level, up a slight hill or at night?  5. NO - Do you currently have a cold, bronchitis or other respiratory infection?  6. NO - Do you have a cough, shortness of breath or wheezing?  7. NO - Do you sometimes get pains in the calves of your legs when you walk?  8. NO - Do you or anyone in your family have previous history of blood clots?  9. NO - Do you or does anyone in your family have a serious bleeding problem such as prolonged bleeding following surgeries or cuts?  10. NO - Have you ever had problems with anemia or been told to take iron pills?  11. NO - Have you had any abnormal blood loss such as black, tarry or bloody stools, or abnormal vaginal bleeding?  12. NO - Have you ever had a blood transfusion?  13. NO - Have you or any of your relatives ever had problems with anesthesia?  14. NO - Do you have sleep apnea, excessive snoring or daytime drowsiness?  15. NO - Do you have any prosthetic heart valves?  16. NO -  Do you have prosthetic joints?  17. NO - Is there any chance that you may be pregnant?      HPI:     HPI related to upcoming procedure: patient with dad and paternal granfather hx of colon cancer. Dad diagnosed at age 60. Patient has a screening colonoscopy at age 43, q5y recommended. No polyps noted yet.       MEDICAL HISTORY:     Patient Active Problem List    Diagnosis Date Noted     Essential hypertension 2018     Priority: Medium     Episodic tension type headache 2018     Priority: Medium     Hypercholesterolemia 2018     Priority: Medium     Acquired deformity of ankle and foot 2018     Priority: Medium     Other affections of shoulder region, not elsewhere classified 2011     Priority: Medium      Past Medical History:   Diagnosis Date     Abnormal finding of blood chemistry     Discovered during infertility work up     Encounter for assisted reproductive fertility procedure cycle          Parvovirus infection     2012,confirmed with antibody; complicated by edema, lft increase, bp increase and  brief renal worsening; resolved by      Personal history of other diseases of the female genital tract      1 para 1-0-0-2 - twin gestation, status post IVF     Presence of (intrauterine) contraceptive device     05,Mirena IUD placed replaced 2010     Past Surgical History:   Procedure Laterality Date      SECTION       section - breech/breech twins     COLONOSCOPY      ,[facility], normal     OTHER SURGICAL HISTORY      46943,ORAL SURGERY,[place] teeth     OTHER SURGICAL HISTORY      [id number],OTHER,IVF     Current Outpatient Prescriptions   Medication Sig Dispense Refill     Cholecalciferol (VITAMIN D3) 1000 UNITS CAPS Take 1,000 Units by mouth daily       cyclobenzaprine (FLEXERIL) 10 MG tablet TAKE 1 TABLET BY MOUTH THREE TIMES DAILY 90 tablet 2     doxylamine (UNISOM) 25 MG TABS tablet Take 25 mg by mouth nightly as needed       fish  "oil-omega-3 fatty acids 1000 MG capsule Take by mouth daily       [START ON 1/2/2019] HYDROcodone-acetaminophen (NORCO) 5-325 MG per tablet Take 1-2 tablets by mouth every 6 hours as needed for pain . Max acetaminophen dose: 4000mg in 24 hrs. 60 tablet 0     L-LYSINE HCL PO Take 500 mg by mouth       lisinopril-hydrochlorothiazide (PRINZIDE/ZESTORETIC) 20-25 MG per tablet Take 1 tablet by mouth daily 90 tablet 3     magnesium 250 MG tablet Take 1 tablet by mouth daily       probiotic CAPS        triamcinolone (KENALOG) 0.5 % cream Apply sparingly to affected area three times daily. 30 g 0     OTC products: recent use of NSAIDs, will stop prior to procedure.     Allergies   Allergen Reactions     Sulfa Drugs Nausea      Latex Allergy: NO    Social History   Substance Use Topics     Smoking status: Never Smoker     Smokeless tobacco: Never Used     Alcohol use Yes      Comment: Alcoholic Drinks/day: 1-2 glasses of wine in a week     History   Drug Use No       REVIEW OF SYSTEMS:   Constitutional, neuro, ENT, endocrine, pulmonary, cardiac, gastrointestinal, genitourinary, musculoskeletal, integument and psychiatric systems are negative, except as otherwise noted.    EXAM:   /72 (BP Location: Right arm, Patient Position: Sitting, Cuff Size: Adult Regular)  Pulse 64  Ht 5' 3.5\" (1.613 m)  Wt 145 lb 12.8 oz (66.1 kg)  BMI 25.42 kg/m2    GENERAL APPEARANCE: healthy, alert and no distress     EYES: EOMI, PERRL     HENT: ear canals and TM's normal and nose and mouth without ulcers or lesions     NECK: no adenopathy, no asymmetry, masses, or scars and thyroid normal to palpation     RESP: lungs clear to auscultation - no rales, rhonchi or wheezes     CV: regular rates and rhythm, normal S1 S2, no S3 or S4 and no murmur, click or rub     ABDOMEN:  soft, nontender, no HSM or masses and bowel sounds normal     MS: extremities normal- no gross deformities noted, no evidence of inflammation in joints, FROM in all " extremities.     SKIN: no suspicious lesions or rashes     NEURO: Normal strength and tone, sensory exam grossly normal, mentation intact and speech normal     PSYCH: mentation appears normal. and affect normal/bright     LYMPHATICS: No cervical adenopathy    DIAGNOSTICS:     Previous labs reviewed. Not repeated today.   Recent Labs   Lab Test  09/06/18   1413  07/29/18   1010   HGB  13.1  11.5*   PLT  287  227   NA  138  132*   POTASSIUM  3.9  2.9*   CR  1.01  1.08      7/18 EKG: RRRB, no acute changes. Not repeated today.     IMPRESSION:   Reason for surgery/procedure: family hx of colon cancer  Diagnosis/reason for consult: HTN    The proposed surgical procedure is considered LOW risk.    REVISED CARDIAC RISK INDEX  The patient has the following serious cardiovascular risks for perioperative complications such as (MI, PE, VFib and 3  AV Block):  No serious cardiac risks  INTERPRETATION: 0 risks: Class I (very low risk - 0.4% complication rate)    The patient has the following additional risks for perioperative complications:  No identified additional risks      ICD-10-CM    1. Preop general physical exam Z01.818    2. Episodic tension-type headache, not intractable G44.219 HYDROcodone-acetaminophen (NORCO) 5-325 MG per tablet     DISCONTINUED: HYDROcodone-acetaminophen (NORCO) 5-325 MG per tablet     DISCONTINUED: HYDROcodone-acetaminophen (NORCO) 5-325 MG per tablet   3. Need for influenza vaccination Z23 [facility] IMM-  HC FLU VAC PRESRV FREE QUAD SPLIT VIR 3+YRS IM   4. Essential hypertension I10    5. Hand dermatitis L30.9 triamcinolone (KENALOG) 0.5 % cream       RECOMMENDATIONS:       --Patient is to take all scheduled medications on the day of surgery EXCEPT for modifications listed below.    APPROVAL GIVEN to proceed with proposed procedure, without further diagnostic evaluation       Signed Electronically by: Jemima Mendez MD    Copy of this evaluation report is provided to requesting physician.    Shelli  Preop Guidelines    Revised Cardiac Risk Index

## 2018-11-02 NOTE — NURSING NOTE
"Patient here for pre op for colonoscopy on 11/19/18.  Marlyn Daley LPN ..........11/2/2018 8:26 AM   Chief Complaint   Patient presents with     Pre-Op Exam     11/19/18 colonoscopy       Initial /72 (BP Location: Right arm, Patient Position: Sitting, Cuff Size: Adult Regular)  Pulse 64  Ht 5' 3.5\" (1.613 m)  Wt 145 lb 12.8 oz (66.1 kg)  BMI 25.42 kg/m2 Estimated body mass index is 25.42 kg/(m^2) as calculated from the following:    Height as of this encounter: 5' 3.5\" (1.613 m).    Weight as of this encounter: 145 lb 12.8 oz (66.1 kg).  Medication Reconciliation: complete    Marlyn Daley LPN    "

## 2018-11-19 ENCOUNTER — TRANSFERRED RECORDS (OUTPATIENT)
Dept: HEALTH INFORMATION MANAGEMENT | Facility: OTHER | Age: 48
End: 2018-11-19

## 2018-11-30 DIAGNOSIS — I10 BENIGN ESSENTIAL HYPERTENSION: ICD-10-CM

## 2018-11-30 RX ORDER — LISINOPRIL AND HYDROCHLOROTHIAZIDE 20; 25 MG/1; MG/1
1 TABLET ORAL DAILY
Qty: 90 TABLET | Refills: 3 | OUTPATIENT
Start: 2018-11-30

## 2018-11-30 NOTE — TELEPHONE ENCOUNTER
Filled 08/27/2018 #90 x 3 to [facility]. [facility] notified. Unable to complete prescription refill per RNMedication Refill Policy.................... Mohini Croft ....................  11/30/2018   11:38 AM        lisinopril-hydrochlorothiazide (PRINZIDE/ZESTORETIC) 20-25 MG per tablet 90 tablet 3 8/27/2018  No   Sig - Route: Take 1 tablet by mouth daily - Oral   Class: E-Prescribe   Order: [id number]   E-Prescribing Status: Receipt confirmed by pharmacy (8/27/2018  4:41 PM CDT)   Printout Tracking   External Result Report   Pharmacy   [id number] ([employer]) - [place], [place] - [address]

## 2018-12-04 DIAGNOSIS — I10 BENIGN ESSENTIAL HYPERTENSION: ICD-10-CM

## 2018-12-06 RX ORDER — LISINOPRIL AND HYDROCHLOROTHIAZIDE 20; 25 MG/1; MG/1
TABLET ORAL
Qty: 30 TABLET | OUTPATIENT
Start: 2018-12-06

## 2018-12-06 NOTE — TELEPHONE ENCOUNTER
Filled 08/27/18 #90 x 3 to [facility]. [facility] notified. Unable to complete prescription refill per RNMedication Refill Policy.................... Mohini Croft ....................  12/6/2018   8:55 AM    lisinopril-hydrochlorothiazide (PRINZIDE/ZESTORETIC) 20-25 MG per tablet 90 tablet 3 8/27/2018  No   Sig - Route: Take 1 tablet by mouth daily - Oral   Class: E-Prescribe   Order: [id number]   E-Prescribing Status: Receipt confirmed by pharmacy (8/27/2018  4:41 PM CDT)   Printout Tracking   External Result Report   Pharmacy   [id number] ([facility] [facility]) - [place], [place] - [address]

## 2018-12-28 ENCOUNTER — MYC MEDICAL ADVICE (OUTPATIENT)
Dept: FAMILY MEDICINE | Facility: OTHER | Age: 48
End: 2018-12-28

## 2018-12-28 DIAGNOSIS — Z82.49 FAMILY HISTORY OF CARDIOVASCULAR DISEASE: Primary | ICD-10-CM

## 2019-01-04 ENCOUNTER — HOSPITAL ENCOUNTER (OUTPATIENT)
Dept: CT IMAGING | Facility: OTHER | Age: 49
Discharge: HOME | End: 2019-01-04
Attending: FAMILY MEDICINE | Admitting: FAMILY MEDICINE

## 2019-01-04 DIAGNOSIS — Z82.49 FAMILY HISTORY OF CARDIOVASCULAR DISEASE: ICD-10-CM

## 2019-01-04 PROCEDURE — 75571 CT HRT W/O DYE W/CA TEST: CPT

## 2019-01-25 ENCOUNTER — MYC REFILL (OUTPATIENT)
Dept: FAMILY MEDICINE | Facility: OTHER | Age: 49
End: 2019-01-25

## 2019-01-25 DIAGNOSIS — G44.219 EPISODIC TENSION-TYPE HEADACHE, NOT INTRACTABLE: ICD-10-CM

## 2019-01-25 RX ORDER — CYCLOBENZAPRINE HCL 10 MG
10 TABLET ORAL 3 TIMES DAILY PRN
Qty: 90 TABLET | Refills: 2 | Status: SHIPPED | OUTPATIENT
Start: 2019-01-25 | End: 2020-01-24

## 2019-01-25 NOTE — TELEPHONE ENCOUNTER
Cyclobenzaprine (FLEXERIL) 10 MG tablet   Last Written Prescription Date:  05/29/2018  Last Fill Quantity: 90,   # refills: 2  Last Office Visit: 11/02/2018  Future Office visit:       Routing refill request to provider for review/approval because: Drug not on the [facility], [facility] or [facility] refill protocol or controlled substance      Unable to complete prescription refill per RNMedication Refill Policy.................... Mohini Croft ....................  1/25/2019   2:48 PM

## 2019-05-31 ENCOUNTER — OFFICE VISIT (OUTPATIENT)
Dept: FAMILY MEDICINE | Facility: OTHER | Age: 49
End: 2019-05-31
Attending: NURSE PRACTITIONER
Payer: COMMERCIAL

## 2019-05-31 VITALS
HEART RATE: 80 BPM | RESPIRATION RATE: 15 BRPM | HEIGHT: 63 IN | SYSTOLIC BLOOD PRESSURE: 110 MMHG | BODY MASS INDEX: 25.73 KG/M2 | WEIGHT: 145.2 LBS | TEMPERATURE: 98.2 F | DIASTOLIC BLOOD PRESSURE: 70 MMHG

## 2019-05-31 DIAGNOSIS — H60.502 ACUTE OTITIS EXTERNA OF LEFT EAR, UNSPECIFIED TYPE: Primary | ICD-10-CM

## 2019-05-31 PROCEDURE — 99213 OFFICE O/P EST LOW 20 MIN: CPT | Performed by: NURSE PRACTITIONER

## 2019-05-31 RX ORDER — BETAMETHASONE DIPROPIONATE 0.5 MG/G
1 OINTMENT, AUGMENTED TOPICAL
COMMUNITY
Start: 2018-12-27 | End: 2019-10-11

## 2019-05-31 RX ORDER — ORPHENADRINE CITRATE 100 MG/1
100 TABLET, EXTENDED RELEASE ORAL
COMMUNITY
Start: 2017-08-12 | End: 2019-10-11

## 2019-05-31 RX ORDER — DIPHENOXYLATE HYDROCHLORIDE AND ATROPINE SULFATE 2.5; .025 MG/1; MG/1
1 TABLET ORAL
COMMUNITY
End: 2019-10-11

## 2019-05-31 RX ORDER — LISINOPRIL 20 MG/1
20 TABLET ORAL
COMMUNITY
Start: 2018-12-28 | End: 2020-02-05

## 2019-05-31 RX ORDER — CIPROFLOXACIN 500 MG/1
500 TABLET, FILM COATED ORAL 2 TIMES DAILY
Refills: 0 | COMMUNITY
Start: 2018-07-26 | End: 2019-10-11

## 2019-05-31 RX ORDER — NEOMYCIN SULFATE, POLYMYXIN B SULFATE, HYDROCORTISONE 3.5; 10000; 1 MG/ML; [USP'U]/ML; MG/ML
3 SOLUTION/ DROPS AURICULAR (OTIC) 4 TIMES DAILY
Qty: 5 ML | Refills: 0 | Status: SHIPPED | OUTPATIENT
Start: 2019-05-31 | End: 2019-10-11

## 2019-05-31 RX ORDER — TRIAMTERENE AND HYDROCHLOROTHIAZIDE 37.5; 25 MG/1; MG/1
CAPSULE ORAL
Refills: 1 | COMMUNITY
Start: 2018-06-22 | End: 2019-10-11

## 2019-05-31 RX ORDER — BETAMETHASONE DIPROPIONATE 0.5 MG/G
OINTMENT, AUGMENTED TOPICAL
Refills: 5 | COMMUNITY
Start: 2018-12-27 | End: 2019-10-11

## 2019-05-31 ASSESSMENT — ANXIETY QUESTIONNAIRES
GAD7 TOTAL SCORE: 0
7. FEELING AFRAID AS IF SOMETHING AWFUL MIGHT HAPPEN: NOT AT ALL
2. NOT BEING ABLE TO STOP OR CONTROL WORRYING: NOT AT ALL
6. BECOMING EASILY ANNOYED OR IRRITABLE: NOT AT ALL
4. TROUBLE RELAXING: NOT AT ALL
5. BEING SO RESTLESS THAT IT IS HARD TO SIT STILL: NOT AT ALL
IF YOU CHECKED OFF ANY PROBLEMS ON THIS QUESTIONNAIRE, HOW DIFFICULT HAVE THESE PROBLEMS MADE IT FOR YOU TO DO YOUR WORK, TAKE CARE OF THINGS AT HOME, OR GET ALONG WITH OTHER PEOPLE: NOT DIFFICULT AT ALL
3. WORRYING TOO MUCH ABOUT DIFFERENT THINGS: NOT AT ALL
1. FEELING NERVOUS, ANXIOUS, OR ON EDGE: NOT AT ALL

## 2019-05-31 ASSESSMENT — PATIENT HEALTH QUESTIONNAIRE - PHQ9: SUM OF ALL RESPONSES TO PHQ QUESTIONS 1-9: 0

## 2019-05-31 ASSESSMENT — PAIN SCALES - GENERAL: PAINLEVEL: MILD PAIN (3)

## 2019-05-31 ASSESSMENT — MIFFLIN-ST. JEOR: SCORE: 1262.75

## 2019-05-31 NOTE — PROGRESS NOTES
"Subjective     Mary Gutierrez is a 49 year old female who presents to clinic today for the following health issues:    HPI   Concern - Ear issue  Onset: earlier this week itching, pain 2-3 days ago    Description: pain, tenderness    Intensity: 3-4/10    Progression of Symptoms:  worsening    Accompanying Signs & Symptoms: Denies pharyngitis, rhinorrhea, otorrhea, coughing, sneezing, watery eyes, difficulty hearing    Previous history of similar problem: Reports history of mild symptoms previously that resolved on there own but never this bad. She wears ear plugs at night due to her husbands snoring and thinks that the recent warm weather and use of ear plugs contributed.    Precipitating factors:   Worsened by: touch, pressure    Alleviating factors:  Improved by: ibuprofen has helped \"a little\" with discomfort.     Therapies Tried and outcome: as above, daughter has history of swimmer's ear- she has used the drops for 2 days and has not noticed an improvement. Uncertain what the name of the drops are.       Patient Active Problem List   Diagnosis     Episodic tension type headache     Hyperlipidemia     Acquired deformity of ankle and foot     Other affections of shoulder region, not elsewhere classified     Essential hypertension     Family history of coronary artery disease     Past Surgical History:   Procedure Laterality Date      SECTION       section - breech/breech twins     COLONOSCOPY      ,[place], normal     COLONOSCOPY  2018    follow up  family history colon cancer Dr. Fair     OTHER SURGICAL HISTORY      [id number],ORAL SURGERY,[place] teeth     OTHER SURGICAL HISTORY      [id number],OTHER,IVF       Social History     Tobacco Use     Smoking status: Never Smoker     Smokeless tobacco: Never Used   Substance Use Topics     Alcohol use: Yes     Comment: Alcoholic Drinks/day: 1-2 glasses of wine in a week     Family History   Problem Relation Age of Onset     Other - See Comments " Maternal Grandmother         Had two spontaneous losses     Ovarian Cancer Maternal Grandmother 82        Cancer-ovarian     Cancer Maternal Grandfather         Cancer,Had rectal cancer     Diabetes Other         Diabetes,Diabetes on maternal side.     Hyperlipidemia Mother         Hyperlipidemia,and CLL dx in 50s     Colon Cancer Father 60        Cancer-colon,colon and liver cancer         Current Outpatient Medications   Medication Sig Dispense Refill     augmented betamethasone dipropionate (DIPROLENE-AF) 0.05 % external ointment Apply 1 Application topically       lisinopril (PRINIVIL/ZESTRIL) 20 MG tablet Take 20 mg by mouth       neomycin-polymyxin-hydrocortisone (CORTISPORIN) 3.5-57685-6 otic solution Place 3 drops Into the left ear 4 times daily for 7 days 5 mL 0     orphenadrine ER (NORFLEX) 100 MG 12 hr tablet Take 100 mg by mouth       augmented betamethasone dipropionate (DIPROLENE-AF) 0.05 % external ointment APPLY TO AFFECTED AREA ON PALM TOPICALLY TWICE A DAY UNTIL CLEAR  5     Cholecalciferol (VITAMIN D3) 1000 UNITS CAPS Take 1,000 Units by mouth daily       ciprofloxacin (CIPRO) 500 MG tablet Take 500 mg by mouth 2 times daily  0     cyclobenzaprine (FLEXERIL) 10 MG tablet Take 1 tablet (10 mg) by mouth 3 times daily as needed for muscle spasms 90 tablet 2     DOCOSAHEXAENOIC ACID PO Take 2 capsules by mouth       doxylamine (UNISOM) 25 MG TABS tablet Take 25 mg by mouth nightly as needed       fish oil-omega-3 fatty acids 1000 MG capsule Take by mouth daily       HYDROcodone-acetaminophen (NORCO) 5-325 MG per tablet Take 1-2 tablets by mouth every 6 hours as needed for pain . Max acetaminophen dose: 4000mg in 24 hrs. 60 tablet 0     L-LYSINE HCL PO Take 500 mg by mouth       lisinopril-hydrochlorothiazide (PRINZIDE/ZESTORETIC) 20-25 MG per tablet Take 1 tablet by mouth daily 90 tablet 3     magnesium 250 MG tablet Take 1 tablet by mouth daily       MAGNESIUM PO Take 1 tablet by mouth       Multiple  "Vitamin (MULTI-VITAMINS) TABS Take 1 tablet by mouth       probiotic CAPS        triamcinolone (KENALOG) 0.5 % cream Apply sparingly to affected area three times daily. 30 g 0     triamterene-HCTZ (DYAZIDE) 37.5-25 MG capsule TAKE 1 CAPSULE BY MOUTH ONCE DAILY  1     Allergies   Allergen Reactions     Sulfa Drugs Nausea     Recent Labs   Lab Test 09/06/18  1413 07/29/18  1010 07/26/18  1446  01/04/17  1316  11/22/13  1037 11/21/12  1015   LDL  --   --   --   --  206*  --  169* 175*   HDL  --   --   --   --  50  --  44 43   TRIG  --   --   --   --  131  --  103 74   ALT  --  41 28  --   --   --   --   --    CR 1.01 1.08 1.10   < >  --    < > 0.72 0.70   GFRESTIMATED 59* 55* 53*   < >  --   --   --   --    GFRESTBLACK 71 66 65   < >  --    < > >60 >60   POTASSIUM 3.9 2.9* 3.7   < >  --    < > 3.9 4.4    < > = values in this interval not displayed.          Reviewed and updated as needed this visit by Provider         Review of Systems   ROS COMP: Constitutional, HEENT, cardiovascular, pulmonary, gi and gu systems are negative, except as otherwise noted.      Objective    /70 (BP Location: Right arm, Patient Position: Sitting, Cuff Size: Adult Large)   Pulse 80   Temp 98.2  F (36.8  C)   Resp 15   Ht 1.6 m (5' 3\")   Wt 65.9 kg (145 lb 3.2 oz)   Breastfeeding? No   BMI 25.72 kg/m    Body mass index is 25.72 kg/m .  Physical Exam   GENERAL: healthy, alert and no distress  EYES: Eyes grossly normal to inspection, conjunctivae and sclerae normal  HENT: RIGHT EAR: normal canal, TM translucent, positive light reflex LEFT EAR: canal edematous, erythematous, tender with exam, no drainage, TM translucent, positive light reflex  NECK: no adenopathy, no asymmetry, masses, or scars  RESP: lungs clear to auscultation - no rales, rhonchi or wheezes  CV: regular rate and rhythm, normal S1 S2, no S3 or S4, no murmur, click or rub  PSYCH: mentation appears normal, affect normal    Diagnostic Test Results:  Labs reviewed in " "Epic  none         Assessment & Plan     1. Acute otitis externa of left ear, unspecified type  Acute, symptomatic. Can continue with Ibuprofen and/or Tylenol, warm pack, start drops as directed.  - neomycin-polymyxin-hydrocortisone (CORTISPORIN) 3.5-19257-6 otic solution; Place 3 drops Into the left ear 4 times daily for 7 days  Dispense: 5 mL; Refill: 0     BMI:   Estimated body mass index is 25.72 kg/m  as calculated from the following:    Height as of this encounter: 1.6 m (5' 3\").    Weight as of this encounter: 65.9 kg (145 lb 3.2 oz).       FUTURE APPOINTMENTS:       - Follow-up visit: no improvement or worsening symptoms    No follow-ups on file.    Kori Sahu [facility] AND [facility] [place]        "

## 2019-05-31 NOTE — NURSING NOTE
Patient presents to the clinic for left ear pain.  Medication Reconciliation Completed.    Nick Marinelli LPN  5/31/2019 9:26 AM

## 2019-05-31 NOTE — PATIENT INSTRUCTIONS
"Assessment & Plan     1. Acute otitis externa of left ear, unspecified type  Acute, symptomatic. Can continue with Ibuprofen and/or Tylenol, warm pack, start drops as directed.  - neomycin-polymyxin-hydrocortisone (CORTISPORIN) 3.5-70940-8 otic solution; Place 3 drops Into the left ear 4 times daily for 7 days  Dispense: 5 mL; Refill: 0     BMI:   Estimated body mass index is 25.72 kg/m  as calculated from the following:    Height as of this encounter: 1.6 m (5' 3\").    Weight as of this encounter: 65.9 kg (145 lb 3.2 oz).       FUTURE APPOINTMENTS:       - Follow-up visit: no improvement or worsening symptoms    No follow-ups on file.    Kori Sahu [facility] AND [facility]  "

## 2019-06-01 ASSESSMENT — ANXIETY QUESTIONNAIRES: GAD7 TOTAL SCORE: 0

## 2019-06-05 ENCOUNTER — TELEPHONE (OUTPATIENT)
Dept: FAMILY MEDICINE | Facility: OTHER | Age: 49
End: 2019-06-05

## 2019-06-05 NOTE — TELEPHONE ENCOUNTER
Panel Management Review      Patient has the following on her problem list: None      Composite cancer screening  Chart review shows that this patient is due/due soon for the following None  Summary:    Patient is due/failing the following:       Action needed:   None Patient doesn't have asthma and isn't on an inhaler    Type of outreach:    None, routed to provider for review.    Questions for provider review:    None                                                                                                                                    Marlyn Daley LPN ..........6/5/2019 4:03 PM      Chart routed to none needed .

## 2019-07-01 ENCOUNTER — HOSPITAL ENCOUNTER (OUTPATIENT)
Dept: MAMMOGRAPHY | Facility: OTHER | Age: 49
Discharge: HOME | End: 2019-07-01
Attending: FAMILY MEDICINE | Admitting: FAMILY MEDICINE
Payer: COMMERCIAL

## 2019-07-01 DIAGNOSIS — Z12.39 BREAST SCREENING, UNSPECIFIED: ICD-10-CM

## 2019-07-01 PROCEDURE — 77063 BREAST TOMOSYNTHESIS BI: CPT

## 2019-10-11 ENCOUNTER — OFFICE VISIT (OUTPATIENT)
Dept: FAMILY MEDICINE | Facility: OTHER | Age: 49
End: 2019-10-11
Attending: FAMILY MEDICINE
Payer: COMMERCIAL

## 2019-10-11 VITALS
RESPIRATION RATE: 16 BRPM | DIASTOLIC BLOOD PRESSURE: 60 MMHG | WEIGHT: 151.2 LBS | HEART RATE: 60 BPM | SYSTOLIC BLOOD PRESSURE: 124 MMHG | BODY MASS INDEX: 26.78 KG/M2 | TEMPERATURE: 98.2 F

## 2019-10-11 DIAGNOSIS — L30.9 HAND DERMATITIS: Primary | ICD-10-CM

## 2019-10-11 DIAGNOSIS — I10 BENIGN ESSENTIAL HYPERTENSION: ICD-10-CM

## 2019-10-11 DIAGNOSIS — G44.219 EPISODIC TENSION-TYPE HEADACHE, NOT INTRACTABLE: ICD-10-CM

## 2019-10-11 PROCEDURE — 99213 OFFICE O/P EST LOW 20 MIN: CPT | Mod: 25 | Performed by: FAMILY MEDICINE

## 2019-10-11 PROCEDURE — 90471 IMMUNIZATION ADMIN: CPT | Performed by: FAMILY MEDICINE

## 2019-10-11 PROCEDURE — 90686 IIV4 VACC NO PRSV 0.5 ML IM: CPT | Performed by: FAMILY MEDICINE

## 2019-10-11 RX ORDER — HYDROCODONE BITARTRATE AND ACETAMINOPHEN 5; 325 MG/1; MG/1
1-2 TABLET ORAL EVERY 6 HOURS PRN
Qty: 60 TABLET | Refills: 0 | Status: SHIPPED | OUTPATIENT
Start: 2019-10-11 | End: 2020-09-25

## 2019-10-11 ASSESSMENT — PAIN SCALES - GENERAL: PAINLEVEL: NO PAIN (0)

## 2019-10-11 NOTE — NURSING NOTE
"Patient here for rash on left hand for over year. Has been seen by Derm.  Also noticed a small lump below the breast she would like to have checked out.   Marlyn Daley LPN ..........10/11/2019 10:50 AM   Chief Complaint   Patient presents with     Derm Problem     rash on left hand       Initial /60 (BP Location: Right arm, Patient Position: Sitting, Cuff Size: Adult Regular)   Pulse 60   Temp 98.2  F (36.8  C) (Tympanic)   Resp 16   Wt 68.6 kg (151 lb 3.2 oz)   LMP  (LMP Unknown)   BMI 26.78 kg/m   Estimated body mass index is 26.78 kg/m  as calculated from the following:    Height as of 5/31/19: 1.6 m (5' 3\").    Weight as of this encounter: 68.6 kg (151 lb 3.2 oz).  Medication Reconciliation: complete    Marlyn Daley LPN    "

## 2019-10-11 NOTE — PROGRESS NOTES
SUBJECTIVE:   Mary Gutierrez is a 49 year old female who presents to clinic today for the following health issues:    Patient presents for ongoing issues with a left palmar rash.  This is been going on for years.  Last year she was given a prescription for triamcinolone 0.5% ointment with minimal improvement.  She did go to [facility] in December and saw dermatology there.  They did a KOH scrape and no fungal organisms were seen.  She was given a prescription for betamethasone ointment, typically uses this 3 times per week, with minimal improvement.  Did discuss that doing a biopsy would be the next recommendation.    Patient is due for a refill of her Norco.  She uses #60 over the course of a year.  This is for management of chronic tension type headaches and other musculoskeletal issues that trigger headaches.      Review of Systems see HPI, ROS otherwise negative.     OBJECTIVE:     /60 (BP Location: Right arm, Patient Position: Sitting, Cuff Size: Adult Regular)   Pulse 60   Temp 98.2  F (36.8  C) (Tympanic)   Resp 16   Wt 68.6 kg (151 lb 3.2 oz)   LMP  (LMP Unknown)   BMI 26.78 kg/m    Body mass index is 26.78 kg/m .  Physical Exam  Constitutional:       Appearance: She is well-developed.   HENT:      Right Ear: External ear normal.      Left Ear: External ear normal.   Eyes:      General: No scleral icterus.     Conjunctiva/sclera: Conjunctivae normal.   Cardiovascular:      Rate and Rhythm: Normal rate.   Pulmonary:      Effort: Pulmonary effort is normal. No respiratory distress.   Skin:     Findings: No rash.      Comments: 5 x 4 cm scaly macule on palm of left hand.   Neurological:      Mental Status: She is alert.         ASSESSMENT/PLAN:         ICD-10-CM    1. Hand dermatitis L30.9 DERMATOLOGY REFERRAL   2. Benign essential hypertension I10    3. Episodic tension-type headache, not intractable G44.219 HYDROcodone-acetaminophen (NORCO) 5-325 MG tablet     Would recommend proceeding with  biopsy of the hand dermatitis.  Offered to facilitate this locally, but ultimately we decided to have her see dermatology in [place].  We will hopefully schedule it in a way that patient is able to get the biopsy done with her first visit, to avoid additional trips.    [facility] database is reviewed and signed.  Hydrocodone acetaminophen 5-325 #60 tablets provided, no refills.      Jemima Mendez MD  [facility] AND \[facility] [place] [facility]\""

## 2019-11-03 ENCOUNTER — HEALTH MAINTENANCE LETTER (OUTPATIENT)
Age: 49
End: 2019-11-03

## 2020-01-24 ENCOUNTER — MYC REFILL (OUTPATIENT)
Dept: FAMILY MEDICINE | Facility: OTHER | Age: 50
End: 2020-01-24

## 2020-01-24 DIAGNOSIS — G44.219 EPISODIC TENSION-TYPE HEADACHE, NOT INTRACTABLE: ICD-10-CM

## 2020-01-28 RX ORDER — CYCLOBENZAPRINE HCL 10 MG
10 TABLET ORAL 3 TIMES DAILY PRN
Qty: 90 TABLET | Refills: 2 | Status: SHIPPED | OUTPATIENT
Start: 2020-01-28 | End: 2020-11-10

## 2020-01-28 NOTE — TELEPHONE ENCOUNTER
Cyclobenzaprine (FLEXERIL) 10 MG tablet  Last Written Prescription Date:  01/25/2019  Last Fill Quantity: 90,   # refills: 2  Last Office Visit: 10/11/2019  Future Office visit:       Routing refill request to provider for review/approval because:  Drug not on the [facility], P or [facility] refill protocol or controlled substance      Unable to complete prescription refill per RNMedication Refill Policy.................... Mohini Croft RN ....................  1/28/2020   11:52 AM

## 2020-02-04 ENCOUNTER — MYC MEDICAL ADVICE (OUTPATIENT)
Dept: FAMILY MEDICINE | Facility: OTHER | Age: 50
End: 2020-02-04

## 2020-02-04 DIAGNOSIS — I10 ESSENTIAL HYPERTENSION: Primary | ICD-10-CM

## 2020-02-05 RX ORDER — LISINOPRIL 20 MG/1
20 TABLET ORAL DAILY
Qty: 90 TABLET | Refills: 3 | Status: SHIPPED | OUTPATIENT
Start: 2020-02-05 | End: 2021-02-08

## 2020-02-05 NOTE — TELEPHONE ENCOUNTER
Routing refill request to provider for review/approval because:  Labs not current:  Potassium and creatinine  historical  LOV: 10/11/2019  Pamela Reeves RN on 2/5/2020 at 4:21 PM

## 2020-09-25 ENCOUNTER — OFFICE VISIT (OUTPATIENT)
Dept: FAMILY MEDICINE | Facility: OTHER | Age: 50
End: 2020-09-25
Attending: FAMILY MEDICINE
Payer: COMMERCIAL

## 2020-09-25 VITALS
RESPIRATION RATE: 18 BRPM | SYSTOLIC BLOOD PRESSURE: 122 MMHG | HEART RATE: 68 BPM | HEIGHT: 63 IN | DIASTOLIC BLOOD PRESSURE: 74 MMHG | TEMPERATURE: 97.6 F | WEIGHT: 135.8 LBS | BODY MASS INDEX: 24.06 KG/M2

## 2020-09-25 DIAGNOSIS — G44.219 EPISODIC TENSION-TYPE HEADACHE, NOT INTRACTABLE: ICD-10-CM

## 2020-09-25 DIAGNOSIS — Z30.432 ENCOUNTER FOR IUD REMOVAL: Primary | ICD-10-CM

## 2020-09-25 DIAGNOSIS — Z53.8 UNSUCCESSFUL ATTEMPT TO INSERT INTRAUTERINE DEVICE (IUD): ICD-10-CM

## 2020-09-25 PROCEDURE — 99213 OFFICE O/P EST LOW 20 MIN: CPT | Mod: 25 | Performed by: FAMILY MEDICINE

## 2020-09-25 PROCEDURE — 58301 REMOVE INTRAUTERINE DEVICE: CPT | Performed by: FAMILY MEDICINE

## 2020-09-25 RX ORDER — HYDROCODONE BITARTRATE AND ACETAMINOPHEN 5; 325 MG/1; MG/1
1-2 TABLET ORAL EVERY 6 HOURS PRN
Qty: 60 TABLET | Refills: 0 | Status: SHIPPED | OUTPATIENT
Start: 2020-09-25 | End: 2021-12-08

## 2020-09-25 ASSESSMENT — PAIN SCALES - GENERAL: PAINLEVEL: NO PAIN (0)

## 2020-09-25 ASSESSMENT — MIFFLIN-ST. JEOR: SCORE: 1215.11

## 2020-09-25 NOTE — PROGRESS NOTES
"  SUBJECTIVE:   Mary Gutierrez is a 50 year old female who presents to clinic today for the following health issues:    Patient presented to have Mirena IUD removed and replaced.  This will be her fourth IUD.  She does not currently get a period with the IUD in place.    She was evaluated at [facility] in December 2018.  She was having hot flashes at that time and an FSH was done which was elevated at 122.  Her estradiol level was less than 25.  She was advised that menopause could not be determined unless she was no longer menstruating.  However, she had not menstruated for a number of years because of the IUD.    She recalls that her mom was in her 40s when she went through menopause.    She also notes that her headaches have recently gotten much better, which would also argue for possible transition to menopause.      Patient Active Problem List    Diagnosis Date Noted     Essential hypertension 07/20/2018     Priority: Medium     Episodic tension type headache 02/01/2018     Priority: Medium     Acquired deformity of ankle and foot 02/01/2018     Priority: Medium     Hyperlipidemia 03/14/2012     Priority: Medium     Family history of coronary artery disease 03/14/2012     Priority: Medium     Other affections of shoulder region, not elsewhere classified 08/29/2011     Priority: Medium         Review of Systems   See HPI, All other systems reviewed and are otherwise negative.       OBJECTIVE:     /74 (BP Location: Right arm, Patient Position: Sitting, Cuff Size: Adult Regular)   Pulse 68   Temp 97.6  F (36.4  C) (Tympanic)   Resp 18   Ht 1.6 m (5' 3\")   Wt 61.6 kg (135 lb 12.8 oz)   LMP  (LMP Unknown)   BMI 24.06 kg/m    Body mass index is 24.06 kg/m .  Physical Exam  Constitutional:       Appearance: She is well-developed.   HENT:      Right Ear: External ear normal.      Left Ear: External ear normal.   Eyes:      General: No scleral icterus.     Conjunctiva/sclera: Conjunctivae normal. "   Cardiovascular:      Rate and Rhythm: Normal rate.   Pulmonary:      Effort: Pulmonary effort is normal. No respiratory distress.   Genitourinary:     Comments: Patient is placed in the lithotomy position.  Speculum is placed and cervix was treated with Betadine.  IUD string is grasped with ring forceps and was unable to be removed with gentle traction.  Ultimately, IUD was removed but consistent forceful contraction needed.  No bleeding following this.  Cervix was then grasped with a tenaculum and sounded to 4 cm.  I was unable to get the flexible sounding instrument further.  It was more difficult to insert the IUD introducer.  It was advanced to 4 cm, but IUD was not in the proper place upon removal of the introducer.  Skin:     Findings: No rash.   Neurological:      Mental Status: She is alert.         ASSESSMENT/PLAN:           ICD-10-CM    1. Encounter for IUD removal  Z30.432    2. Episodic tension-type headache, not intractable  G44.219 HYDROcodone-acetaminophen (NORCO) 5-325 MG tablet   3. Unsuccessful attempt to insert intrauterine device (IUD)  Z53.8      Difficult IUD removal, possible scar tissue preventing re-insertion.   Also with more stenotic os, possibly related to post-menopausal status.   Reviewed options.   Recommend leaving IUD out for 2-4 weeks and repeating FSH. Use condoms for birth control.   Offered OB/Gyn consult next week to discuss further, possibly re-attempt IUD placement. Consider Cytotec prior.   Reviewed all other birth control options, but suspect patient is through menopause.   Patient will consider and we will touch base next week.     Jemima Mendez MD  [facility] AND [facility]

## 2020-09-28 ENCOUNTER — MYC MEDICAL ADVICE (OUTPATIENT)
Dept: FAMILY MEDICINE | Facility: OTHER | Age: 50
End: 2020-09-28

## 2020-09-28 DIAGNOSIS — I10 ESSENTIAL HYPERTENSION: Primary | ICD-10-CM

## 2020-09-28 DIAGNOSIS — Z78.0 MENOPAUSE: ICD-10-CM

## 2020-11-01 DIAGNOSIS — I10 ESSENTIAL HYPERTENSION: ICD-10-CM

## 2020-11-02 DIAGNOSIS — Z78.0 MENOPAUSE: ICD-10-CM

## 2020-11-02 DIAGNOSIS — I10 ESSENTIAL HYPERTENSION: ICD-10-CM

## 2020-11-02 LAB
ANION GAP SERPL CALCULATED.3IONS-SCNC: 6 MMOL/L (ref 3–14)
BUN SERPL-MCNC: 23 MG/DL (ref 7–25)
CALCIUM SERPL-MCNC: 9.9 MG/DL (ref 8.6–10.3)
CHLORIDE SERPL-SCNC: 102 MMOL/L (ref 98–107)
CHOLEST SERPL-MCNC: 271 MG/DL
CO2 SERPL-SCNC: 30 MMOL/L (ref 21–31)
CREAT SERPL-MCNC: 0.95 MG/DL (ref 0.6–1.2)
ERYTHROCYTE [DISTWIDTH] IN BLOOD BY AUTOMATED COUNT: 13.1 % (ref 10–15)
ESTRADIOL SERPL-MCNC: <20 PG/ML
FSH SERPL-ACNC: 138.4 IU/L
GFR SERPL CREATININE-BSD FRML MDRD: 63 ML/MIN/{1.73_M2}
GLUCOSE SERPL-MCNC: 105 MG/DL (ref 70–105)
HCT VFR BLD AUTO: 45.3 % (ref 35–47)
HDLC SERPL-MCNC: 49 MG/DL (ref 23–92)
HGB BLD-MCNC: 15.1 G/DL (ref 11.7–15.7)
LDLC SERPL CALC-MCNC: 194 MG/DL
MCH RBC QN AUTO: 30.8 PG (ref 26.5–33)
MCHC RBC AUTO-ENTMCNC: 33.3 G/DL (ref 31.5–36.5)
MCV RBC AUTO: 92 FL (ref 78–100)
NONHDLC SERPL-MCNC: 222 MG/DL
PLATELET # BLD AUTO: 259 10E9/L (ref 150–450)
POTASSIUM SERPL-SCNC: 4.1 MMOL/L (ref 3.5–5.1)
RBC # BLD AUTO: 4.9 10E12/L (ref 3.8–5.2)
SODIUM SERPL-SCNC: 138 MMOL/L (ref 134–144)
TRIGL SERPL-MCNC: 141 MG/DL
WBC # BLD AUTO: 5.6 10E9/L (ref 4–11)

## 2020-11-02 PROCEDURE — 80048 BASIC METABOLIC PNL TOTAL CA: CPT | Mod: ZL | Performed by: FAMILY MEDICINE

## 2020-11-02 PROCEDURE — 83001 ASSAY OF GONADOTROPIN (FSH): CPT | Mod: ZL | Performed by: FAMILY MEDICINE

## 2020-11-02 PROCEDURE — 36415 COLL VENOUS BLD VENIPUNCTURE: CPT | Mod: ZL | Performed by: FAMILY MEDICINE

## 2020-11-02 PROCEDURE — 82670 ASSAY OF TOTAL ESTRADIOL: CPT | Mod: ZL | Performed by: FAMILY MEDICINE

## 2020-11-02 PROCEDURE — 85027 COMPLETE CBC AUTOMATED: CPT | Mod: ZL | Performed by: FAMILY MEDICINE

## 2020-11-02 PROCEDURE — 80061 LIPID PANEL: CPT | Mod: ZL | Performed by: FAMILY MEDICINE

## 2020-11-02 RX ORDER — LISINOPRIL 20 MG/1
20 TABLET ORAL DAILY
Qty: 90 TABLET | Refills: 3 | OUTPATIENT
Start: 2020-11-02

## 2020-11-02 NOTE — TELEPHONE ENCOUNTER
Filled 02/05/2020 #90 x 3. Due 02/05/2021. Pharmacy alerted. Unable to complete prescription refill per RNMedication Refill Policy.................... Mohini Croft RN ....................  11/2/2020   1:24 PM    lisinopril (PRINIVIL/ZESTRIL) 20 MG tablet 90 tablet 3 2/5/2020  No   Sig - Route: Take 1 tablet (20 mg) by mouth daily - Oral   Sent to pharmacy as: lisinopril (PRINIVIL/ZESTRIL) 20 MG tablet   Class: E-Prescribe   Order: [id number]   E-Prescribing Status: Receipt confirmed by pharmacy (2/5/2020  4:50 PM CST)   Printout Tracking    External Result Report   Pharmacy    [id number] - [place], [place] - [address]

## 2020-11-10 ENCOUNTER — MYC REFILL (OUTPATIENT)
Dept: FAMILY MEDICINE | Facility: OTHER | Age: 50
End: 2020-11-10

## 2020-11-10 DIAGNOSIS — G44.219 EPISODIC TENSION-TYPE HEADACHE, NOT INTRACTABLE: ICD-10-CM

## 2020-11-11 RX ORDER — CYCLOBENZAPRINE HCL 10 MG
10 TABLET ORAL 3 TIMES DAILY PRN
Qty: 90 TABLET | Refills: 2 | Status: SHIPPED | OUTPATIENT
Start: 2020-11-11 | End: 2022-01-12

## 2020-11-16 ENCOUNTER — HEALTH MAINTENANCE LETTER (OUTPATIENT)
Age: 50
End: 2020-11-16

## 2020-12-04 ENCOUNTER — HOSPITAL ENCOUNTER (OUTPATIENT)
Dept: MAMMOGRAPHY | Facility: OTHER | Age: 50
Discharge: HOME | End: 2020-12-04
Attending: FAMILY MEDICINE | Admitting: FAMILY MEDICINE
Payer: COMMERCIAL

## 2020-12-04 DIAGNOSIS — Z12.31 VISIT FOR SCREENING MAMMOGRAM: ICD-10-CM

## 2020-12-04 PROCEDURE — 77063 BREAST TOMOSYNTHESIS BI: CPT

## 2021-02-07 ENCOUNTER — MYC MEDICAL ADVICE (OUTPATIENT)
Dept: FAMILY MEDICINE | Facility: OTHER | Age: 51
End: 2021-02-07

## 2021-02-07 DIAGNOSIS — I10 ESSENTIAL HYPERTENSION: ICD-10-CM

## 2021-02-08 RX ORDER — LISINOPRIL 20 MG/1
20 TABLET ORAL DAILY
Qty: 90 TABLET | Refills: 0 | Status: SHIPPED | OUTPATIENT
Start: 2021-02-08 | End: 2021-04-26

## 2021-02-08 NOTE — TELEPHONE ENCOUNTER
[id number] sent Rx request for the following:      Requested Prescriptions   Pending Prescriptions Disp Refills     lisinopril (ZESTRIL) 20 MG tablet 90 tablet 3     Sig: Take 1 tablet (20 mg) by mouth daily       ACE Inhibitors (Including Combos) Protocol Passed - 2/8/2021  8:38 AM     Last Prescription Date:   2/5/20209  Last Fill Qty/Refills:       90  , R-3    Last Office Visit:              9/25/2020 ( [place])    Future Office visit:           None noted    Prescription approved per [facility] Refill Protocol. Lady Adams RN ....................  2/8/2021   2:37 PM

## 2021-02-09 DIAGNOSIS — I10 ESSENTIAL HYPERTENSION: ICD-10-CM

## 2021-02-09 RX ORDER — LISINOPRIL 20 MG/1
20 TABLET ORAL DAILY
Qty: 90 TABLET | Refills: 0 | OUTPATIENT
Start: 2021-02-09

## 2021-02-09 NOTE — TELEPHONE ENCOUNTER
[id number] sent Rx request for the following:   lisinopril (ZESTRIL) 20 MG tablet  Sig:Take 1 tablet (20 mg) by mouth daily    Last Prescription Date:   02/08/2021  Last Fill Qty/Refills:         90, R-0      Redundant refill request refused: Too soon:    Unable to complete prescription refill per RN Medication Refill Policy.................... Viktoria Pappas RN ....................  2/9/2021   3:04 PM

## 2021-04-26 DIAGNOSIS — I10 ESSENTIAL HYPERTENSION: ICD-10-CM

## 2021-04-26 RX ORDER — LISINOPRIL 20 MG/1
20 TABLET ORAL DAILY
Qty: 90 TABLET | Refills: 1 | Status: SHIPPED | OUTPATIENT
Start: 2021-04-26 | End: 2021-12-08

## 2021-04-26 NOTE — TELEPHONE ENCOUNTER
"[id number] sent Rx request for the following:   lisinopril (ZESTRIL) 20 MG tablet   Sig TAKE 1 TABLET (20 MG) BY MOUTH DAILY    Last Prescription Date:   02/08/2021  Last Fill Qty/Refills:         90, R-0    Last Office Visit:              09/25/2020 ([place])   Future Office visit:           None noted   ACE Inhibitors (Including Combos) Protocol Passed - 4/26/2021  8:32 AM        Passed - Blood pressure under 140/90 in past 12 months     BP Readings from Last 3 Encounters:   09/25/20 122/74   10/11/19 124/60   05/31/19 110/70                 Passed - Recent (12 mo) or future (30 days) visit within the authorizing provider's specialty     Patient has had an office visit with the authorizing provider or a provider within the authorizing providers department within the previous 12 mos or has a future within next 30 days. See \"Patient Info\" tab in inbasket, or \"Choose Columns\" in Meds & Orders section of the refill encounter.              Passed - Medication is active on med list        Passed - Patient is age 18 or older        Passed - No active pregnancy on record        Passed - Normal serum creatinine on file in past 12 months     Recent Labs   Lab Test 11/02/20  0824   CR 0.95       Ok to refill medication if creatinine is low          Passed - Normal serum potassium on file in past 12 months     Recent Labs   Lab Test 11/02/20  0824   POTASSIUM 4.1             Passed - No positive pregnancy test within past 12 months         Prescription approved per [facility] Refill Protocol.  Viktoria Ram RN ....................  4/26/2021   3:23 PM        "

## 2021-09-18 ENCOUNTER — HEALTH MAINTENANCE LETTER (OUTPATIENT)
Age: 51
End: 2021-09-18

## 2021-10-19 DIAGNOSIS — I10 ESSENTIAL HYPERTENSION: ICD-10-CM

## 2021-10-21 RX ORDER — LISINOPRIL 20 MG/1
20 TABLET ORAL DAILY
Qty: 90 TABLET | Refills: 0 | OUTPATIENT
Start: 2021-10-21

## 2021-10-21 NOTE — TELEPHONE ENCOUNTER
LISINOPRIL 20MG TABLET      Last Written Prescription Date:  4/26/21  Last Fill Quantity: 90,   # refills: 1  Last Office Visit: 09/25/20  Future Office visit:       Routing refill request to provider for review/approval because:  Drug not on the [facility], P or [facility] refill protocol or controlled substance. Unable to complete prescription refill per RNMedication Refill Policy.................... Tonja Brody RN ....................  10/21/2021   3:44 PM

## 2021-12-08 ENCOUNTER — OFFICE VISIT (OUTPATIENT)
Dept: FAMILY MEDICINE | Facility: OTHER | Age: 51
End: 2021-12-08
Attending: FAMILY MEDICINE
Payer: COMMERCIAL

## 2021-12-08 VITALS
HEART RATE: 76 BPM | DIASTOLIC BLOOD PRESSURE: 74 MMHG | OXYGEN SATURATION: 100 % | BODY MASS INDEX: 25.23 KG/M2 | WEIGHT: 142.4 LBS | SYSTOLIC BLOOD PRESSURE: 122 MMHG | TEMPERATURE: 97.6 F | RESPIRATION RATE: 18 BRPM | HEIGHT: 63 IN

## 2021-12-08 DIAGNOSIS — Z00.00 HEALTH CARE MAINTENANCE: Primary | ICD-10-CM

## 2021-12-08 DIAGNOSIS — G44.219 EPISODIC TENSION-TYPE HEADACHE, NOT INTRACTABLE: ICD-10-CM

## 2021-12-08 DIAGNOSIS — I10 ESSENTIAL HYPERTENSION: ICD-10-CM

## 2021-12-08 PROCEDURE — G0123 SCREEN CERV/VAG THIN LAYER: HCPCS | Performed by: FAMILY MEDICINE

## 2021-12-08 PROCEDURE — 87624 HPV HI-RISK TYP POOLED RSLT: CPT | Mod: ZL | Performed by: FAMILY MEDICINE

## 2021-12-08 PROCEDURE — 99214 OFFICE O/P EST MOD 30 MIN: CPT | Performed by: FAMILY MEDICINE

## 2021-12-08 RX ORDER — LISINOPRIL 20 MG/1
20 TABLET ORAL DAILY
Qty: 90 TABLET | Refills: 3 | Status: SHIPPED | OUTPATIENT
Start: 2021-12-08 | End: 2022-11-07

## 2021-12-08 RX ORDER — HYDROCODONE BITARTRATE AND ACETAMINOPHEN 5; 325 MG/1; MG/1
1-2 TABLET ORAL EVERY 6 HOURS PRN
Qty: 60 TABLET | Refills: 0 | Status: SHIPPED | OUTPATIENT
Start: 2021-12-08 | End: 2023-03-22

## 2021-12-08 ASSESSMENT — PAIN SCALES - GENERAL: PAINLEVEL: NO PAIN (0)

## 2021-12-08 ASSESSMENT — MIFFLIN-ST. JEOR: SCORE: 1240.05

## 2021-12-08 NOTE — PROGRESS NOTES
"  Assessment & Plan       ICD-10-CM    1. Health care maintenance  Z00.00 MA Screen Bilateral w/Poncho     Pap Screen with HPV - recommended age 30 - 65 years     HPV High Risk Types DNA Cervical   2. Essential hypertension  I10 lisinopril (ZESTRIL) 20 MG tablet     Basic Metabolic Panel     Lipid Panel     TSH Reflex GH   3. Episodic tension-type headache, not intractable  G44.219 HYDROcodone-acetaminophen (NORCO) 5-325 MG tablet       Medications reviewed.  Labs today.  Norco refilled 5-325 mg #60, patient will continue to use sparingly.  Reviewed current recommendations regarding cervical cancer screening.  Pap smear done today.  Patient is referred for a mammogram.  Reviewed CBT for insomnia.  Discussed medications that are available, would recommend behavioral changes first.       BMI:   Estimated body mass index is 25.23 kg/m  as calculated from the following:    Height as of this encounter: 1.6 m (5' 3\").    Weight as of this encounter: 64.6 kg (142 lb 6.4 oz).       Jemima Mendez MD  [facility] AND HOSPITAL    Jeromy Hernandez is a 51 year old who presents for the following health issues     Patient presents for medication refills.  At the time of her last visit, labs confirmed that she was likely going through menopause.  We had difficulty replacing an IUD at that visit.  She has had no periods.  She really has not had other signs of menopause either, no concerning hot flashes.  She has, however, found it more difficult to fall asleep.    Headaches seem to have gotten a lot better.  She uses Norco very sparingly for management of headaches that were likely affected by hormones.             Objective    /74 (BP Location: Right arm, Patient Position: Sitting, Cuff Size: Adult Regular)   Pulse 76   Temp 97.6  F (36.4  C) (Tympanic)   Resp 18   Ht 1.6 m (5' 3\")   Wt 64.6 kg (142 lb 6.4 oz)   LMP  (LMP Unknown)   SpO2 100%   BMI 25.23 kg/m    Body mass index is 25.23 kg/m .  Physical " Exam  Constitutional:       Appearance: She is well-developed.   Eyes:      Conjunctiva/sclera: Conjunctivae normal.   Neck:      Thyroid: No thyromegaly.   Cardiovascular:      Rate and Rhythm: Normal rate and regular rhythm.      Heart sounds: Normal heart sounds. No murmur heard.      Pulmonary:      Effort: Pulmonary effort is normal. No respiratory distress.      Breath sounds: Normal breath sounds.   Abdominal:      Palpations: Abdomen is soft.   Genitourinary:     Comments: Pelvic exam: normal vagina and vulva, normal cervix without lesions or tenderness, pap smear done.    Lymphadenopathy:      Cervical: No cervical adenopathy.   Skin:     Findings: No rash.   Neurological:      Mental Status: She is alert and oriented to person, place, and time.        PDMP Review       Value Time User    State PDMP site checked  Yes 12/13/2021  1:45 PM Jemima Mendez MD

## 2021-12-08 NOTE — NURSING NOTE
"Patient here for medication management  Marlyn Daley LPN ..........12/8/2021 10:54 AM   Chief Complaint   Patient presents with     Recheck Medication     refills       Initial /74 (BP Location: Right arm, Patient Position: Sitting, Cuff Size: Adult Regular)   Pulse 76   Temp 97.6  F (36.4  C) (Tympanic)   Resp 18   Ht 1.6 m (5' 3\")   Wt 64.6 kg (142 lb 6.4 oz)   LMP  (LMP Unknown)   SpO2 100%   BMI 25.23 kg/m   Estimated body mass index is 25.23 kg/m  as calculated from the following:    Height as of this encounter: 1.6 m (5' 3\").    Weight as of this encounter: 64.6 kg (142 lb 6.4 oz).  Medication Reconciliation: complete    Marlyn Daley LPN    Advance Care Directive reviewed    "

## 2021-12-13 LAB
BKR LAB AP GYN ADEQUACY: NORMAL
BKR LAB AP GYN INTERPRETATION: NORMAL
BKR LAB AP HPV REFLEX: NORMAL
BKR LAB AP PREVIOUS ABNORMAL: NORMAL
PATH REPORT.COMMENTS IMP SPEC: NORMAL
PATH REPORT.COMMENTS IMP SPEC: NORMAL
PATH REPORT.RELEVANT HX SPEC: NORMAL

## 2021-12-15 ENCOUNTER — LAB (OUTPATIENT)
Dept: LAB | Facility: OTHER | Age: 51
End: 2021-12-15
Attending: FAMILY MEDICINE
Payer: COMMERCIAL

## 2021-12-15 DIAGNOSIS — I10 ESSENTIAL HYPERTENSION: ICD-10-CM

## 2021-12-15 LAB
ANION GAP SERPL CALCULATED.3IONS-SCNC: 9 MMOL/L (ref 3–14)
BUN SERPL-MCNC: 23 MG/DL (ref 7–25)
CALCIUM SERPL-MCNC: 10.2 MG/DL (ref 8.6–10.3)
CHLORIDE BLD-SCNC: 101 MMOL/L (ref 98–107)
CHOLEST SERPL-MCNC: 295 MG/DL
CO2 SERPL-SCNC: 28 MMOL/L (ref 21–31)
CREAT SERPL-MCNC: 1.01 MG/DL (ref 0.6–1.2)
FASTING STATUS PATIENT QL REPORTED: ABNORMAL
GFR SERPL CREATININE-BSD FRML MDRD: 66 ML/MIN/1.73M2
GLUCOSE BLD-MCNC: 91 MG/DL (ref 70–105)
HDLC SERPL-MCNC: 54 MG/DL (ref 23–92)
HUMAN PAPILLOMA VIRUS 16 DNA: NEGATIVE
HUMAN PAPILLOMA VIRUS 18 DNA: NEGATIVE
HUMAN PAPILLOMA VIRUS FINAL DIAGNOSIS: NORMAL
HUMAN PAPILLOMA VIRUS OTHER HR: NEGATIVE
LDLC SERPL CALC-MCNC: 213 MG/DL
NONHDLC SERPL-MCNC: 241 MG/DL
POTASSIUM BLD-SCNC: 4.1 MMOL/L (ref 3.5–5.1)
SODIUM SERPL-SCNC: 138 MMOL/L (ref 134–144)
TRIGL SERPL-MCNC: 141 MG/DL
TSH SERPL DL<=0.005 MIU/L-ACNC: 3.05 MU/L (ref 0.4–4)

## 2021-12-15 PROCEDURE — 80061 LIPID PANEL: CPT | Mod: ZL

## 2021-12-15 PROCEDURE — 36415 COLL VENOUS BLD VENIPUNCTURE: CPT | Mod: ZL

## 2021-12-15 PROCEDURE — 80048 BASIC METABOLIC PNL TOTAL CA: CPT | Mod: ZL

## 2021-12-15 PROCEDURE — 84443 ASSAY THYROID STIM HORMONE: CPT | Mod: ZL

## 2021-12-16 ENCOUNTER — HOSPITAL ENCOUNTER (OUTPATIENT)
Dept: MAMMOGRAPHY | Facility: OTHER | Age: 51
Discharge: HOME | End: 2021-12-16
Attending: FAMILY MEDICINE | Admitting: FAMILY MEDICINE
Payer: COMMERCIAL

## 2021-12-16 DIAGNOSIS — Z00.00 HEALTH CARE MAINTENANCE: ICD-10-CM

## 2021-12-16 DIAGNOSIS — Z12.31 VISIT FOR SCREENING MAMMOGRAM: ICD-10-CM

## 2021-12-16 PROCEDURE — 77063 BREAST TOMOSYNTHESIS BI: CPT

## 2022-01-08 ENCOUNTER — HEALTH MAINTENANCE LETTER (OUTPATIENT)
Age: 52
End: 2022-01-08

## 2022-01-09 DIAGNOSIS — G44.219 EPISODIC TENSION-TYPE HEADACHE, NOT INTRACTABLE: ICD-10-CM

## 2022-01-12 RX ORDER — CYCLOBENZAPRINE HCL 10 MG
10 TABLET ORAL 3 TIMES DAILY PRN
Qty: 90 TABLET | Refills: 2 | Status: SHIPPED | OUTPATIENT
Start: 2022-01-12 | End: 2023-02-07

## 2022-01-12 NOTE — TELEPHONE ENCOUNTER
Jemima Mendez RN unable to approve per Medication Refill Protocol requirements. Please review, thank you. Rosita Cavazos RN  1/12/2022  8:37 AM    Last Prescription Date: 11/11/2020  Last Qty/Refills: 90 / 2  Last Office Visit: 12/28/2021 Andrea  Future Office Visit: n/a     Requested Prescriptions   Pending Prescriptions Disp Refills     cyclobenzaprine (FLEXERIL) 10 MG tablet [Pharmacy Med Name: CYCLOBENZAPRINE 10MG TABLET] 90 tablet 1     Sig: TAKE 1 TABLET (10 MG) BY MOUTH 3 TIMES DAILY AS NEEDED FOR MUSCLE SPASMS       There is no refill protocol information for this order

## 2022-04-29 NOTE — TELEPHONE ENCOUNTER
Received a fax from a pharmacy wanting me to get a PA on HYDROcodone-acetaminophen (NORCO) 5-325 MG per tablet but it's listed as a historical med in the chart/med list so it's missing the DX, amount to dispense, number of refills, prescriber, whether it's CHARLIE or not, and the pharmacy.  Do you still want this patient on this medication and if so can you please update/fix the med in the med list/chart and let me know when it's done?  If you don't want this patient on this medication, please let me know how to proceed.  Beatriz Carias on 4/25/2018 at 2:44 PM     Cell Phone/PDA (specify)/Clothing

## 2022-06-28 ENCOUNTER — OFFICE VISIT (OUTPATIENT)
Dept: FAMILY MEDICINE | Facility: OTHER | Age: 52
End: 2022-06-28
Attending: FAMILY MEDICINE
Payer: COMMERCIAL

## 2022-06-28 VITALS
OXYGEN SATURATION: 99 % | DIASTOLIC BLOOD PRESSURE: 86 MMHG | WEIGHT: 146 LBS | SYSTOLIC BLOOD PRESSURE: 130 MMHG | HEART RATE: 92 BPM | BODY MASS INDEX: 25.86 KG/M2 | TEMPERATURE: 98 F | RESPIRATION RATE: 18 BRPM

## 2022-06-28 DIAGNOSIS — R05.9 COUGH: Primary | ICD-10-CM

## 2022-06-28 PROCEDURE — 99213 OFFICE O/P EST LOW 20 MIN: CPT | Performed by: FAMILY MEDICINE

## 2022-06-28 RX ORDER — AZITHROMYCIN 250 MG/1
TABLET, FILM COATED ORAL
Qty: 6 TABLET | Refills: 0 | Status: SHIPPED | OUTPATIENT
Start: 2022-06-28 | End: 2022-07-03

## 2022-06-28 RX ORDER — ROSUVASTATIN CALCIUM 10 MG/1
TABLET, COATED ORAL
COMMUNITY
Start: 2022-05-27 | End: 2023-05-25

## 2022-06-28 RX ORDER — FLUOCINONIDE 0.5 MG/G
CREAM TOPICAL
COMMUNITY
Start: 2022-05-12 | End: 2024-06-04

## 2022-06-28 ASSESSMENT — PATIENT HEALTH QUESTIONNAIRE - PHQ9
SUM OF ALL RESPONSES TO PHQ QUESTIONS 1-9: 2
10. IF YOU CHECKED OFF ANY PROBLEMS, HOW DIFFICULT HAVE THESE PROBLEMS MADE IT FOR YOU TO DO YOUR WORK, TAKE CARE OF THINGS AT HOME, OR GET ALONG WITH OTHER PEOPLE: NOT DIFFICULT AT ALL
SUM OF ALL RESPONSES TO PHQ QUESTIONS 1-9: 2

## 2022-06-28 ASSESSMENT — PAIN SCALES - GENERAL: PAINLEVEL: NO PAIN (0)

## 2022-06-28 NOTE — NURSING NOTE
Chief Complaint   Patient presents with     Cough     Prod cough and sinus congestion x 2 wks     Medication Reconciliation: complete    FOOD SECURITY SCREENING QUESTIONS:    The next two questions are to help us understand your food security.  If you are feeling you need any assistance in this area, we have resources available to support you today.    Hunger Vital Signs:  Within the past 12 months we worried whether our food would run out before we got money to buy more. Never  Within the past 12 months the food we bought just didn't last and we didn't have money to get more. Never    Alyce Corrales CMA ([facility])   Breath sounds clear and equal bilaterally.

## 2022-06-28 NOTE — PROGRESS NOTES
SUBJECTIVE:  Mary Gutierrez is a 52 year old female here for productive cough.  She has had the symptoms for 2 weeks.  It started with sinus congestion as well but that has slowly been improving.  She is had a sore throat from coughing.  No fevers or chills.  No sick contacts at home.  She tested negative for COVID twice at home  by 3 days.  She has had some shortness of breath and wheezing with exertion.  She had history of asthma as a child but has not had asthma as an adult.    Allergies:  Allergies   Allergen Reactions     Sulfa Drugs Nausea       ROS:    As above otherwise ROS is unremarkable.    OBJECTIVE:  /86   Pulse 92   Temp 98  F (36.7  C) (Tympanic)   Resp 18   Wt 66.2 kg (146 lb)   LMP  (LMP Unknown)   SpO2 99%   BMI 25.86 kg/m      EXAM:  General Appearance: Pleasant, alert, appropriate appearance for age. No acute distress  Head: Normal. Normocephalic, atraumatic.  Eyes: PERRL, EOMI  Ears: Normal TM's bilaterally. Normal auditory canals and external ears.   OroPharynx: Dental hygiene adequate. Normal buccal mucosa. Normal pharynx.  Neck: Supple, no masses or nodes, no lymphadenopathy.  No thyromegaly.  Lungs: Normal chest wall and respirations. Clear to auscultation, no wheezes or crackles.  Cardiovascular: Regular rate and rhythm. S1, S2, no murmurs.  Musculoskeletal: No edema.  Skin: no concerning or new rashes.  Psychiatric Exam: Alert and oriented, appropriate affect.    ASSESSEMENT AND PLAN:    1. Cough      Her exam is relatively benign but however given the duration of symptoms I think is reasonable to trial azithromycin.  Potential side effects and proper use were discussed.  She will contact me if she has any worsening symptoms or new concerns.    Javier Burleson MD  Family Medicine

## 2022-08-02 ENCOUNTER — MYC MEDICAL ADVICE (OUTPATIENT)
Dept: FAMILY MEDICINE | Facility: OTHER | Age: 52
End: 2022-08-02

## 2022-08-02 DIAGNOSIS — E78.5 HYPERLIPIDEMIA, UNSPECIFIED HYPERLIPIDEMIA TYPE: Primary | ICD-10-CM

## 2022-08-19 ENCOUNTER — LAB (OUTPATIENT)
Dept: LAB | Facility: OTHER | Age: 52
End: 2022-08-19
Attending: FAMILY MEDICINE
Payer: COMMERCIAL

## 2022-08-19 DIAGNOSIS — E78.5 HYPERLIPIDEMIA, UNSPECIFIED HYPERLIPIDEMIA TYPE: ICD-10-CM

## 2022-08-19 LAB
CHOLEST SERPL-MCNC: 213 MG/DL
FASTING STATUS PATIENT QL REPORTED: YES
HDLC SERPL-MCNC: 52 MG/DL (ref 23–92)
LDLC SERPL CALC-MCNC: 117 MG/DL
NONHDLC SERPL-MCNC: 161 MG/DL
TRIGL SERPL-MCNC: 221 MG/DL

## 2022-08-19 PROCEDURE — 80061 LIPID PANEL: CPT | Mod: ZL

## 2022-08-19 PROCEDURE — 36415 COLL VENOUS BLD VENIPUNCTURE: CPT | Mod: ZL

## 2022-11-05 DIAGNOSIS — I10 ESSENTIAL HYPERTENSION: ICD-10-CM

## 2022-11-07 RX ORDER — LISINOPRIL 20 MG/1
20 TABLET ORAL DAILY
Qty: 90 TABLET | Refills: 1 | Status: SHIPPED | OUTPATIENT
Start: 2022-11-07 | End: 2023-03-22

## 2022-11-07 NOTE — TELEPHONE ENCOUNTER
[id number] sent Rx request for the following:    Patient enrolled in our Rx Med Sync service to improve adherence. We are requesting a refill authorization in advance to ensure an active prescription is on file.     Requested Prescriptions   Pending Prescriptions Disp Refills     lisinopril (ZESTRIL) 20 MG tablet [Pharmacy Med Name: LISINOPRIL 20MG TABLET] 90 tablet 3     Sig: TAKE 1 TABLET (20 MG) BY MOUTH DAILY   Last Prescription Date:   12/8/21  Last Fill Qty/Refills:         90, R-3    Last Office Visit:              6/28/22   Future Office visit:           None    Viviane Alexandre RN .............. 11/7/2022  12:22 PM

## 2022-11-19 ENCOUNTER — HEALTH MAINTENANCE LETTER (OUTPATIENT)
Age: 52
End: 2022-11-19

## 2023-02-02 DIAGNOSIS — G44.219 EPISODIC TENSION-TYPE HEADACHE, NOT INTRACTABLE: ICD-10-CM

## 2023-02-02 NOTE — LETTER
February 7, 2023      Mary Gutierrez  [address] [address] [address]        Dear Mary,       This letter is to remind you that you are due for your annual exam with Jemima Mendez. Your last comprehensive visit was more than 12 months ago.        Please call the clinic at [phone] to schedule your appointment.        If you are no longer seeing Jemima Mendez for primary care, please call to let us know.       Thank you for choosing [facility] and [facility] for your health care needs.    Sincerely,    Refill LITO  [facility]

## 2023-02-07 RX ORDER — CYCLOBENZAPRINE HCL 10 MG
10 TABLET ORAL 3 TIMES DAILY PRN
Qty: 10 TABLET | Refills: 0 | Status: SHIPPED | OUTPATIENT
Start: 2023-02-07 | End: 2023-03-22

## 2023-02-07 NOTE — TELEPHONE ENCOUNTER
Routing refill request to provider for review/approval because:    LOV: 12/8/21  Patient due for annual review  Letter and my chart message sent  Will route to outreach to call patient and help assist in scheduling appointment.  Dr. Mendez out of office will route to covering provider for review and consideration.  Pamela Reeves RN on 2/7/2023 at 11:12 AM

## 2023-02-07 NOTE — TELEPHONE ENCOUNTER
LMTCB to schedule appointment.    Pt is due for annual exam. Letter and MyChart message sent 2/7/23.    Delmy Hickey on 2/7/2023 at 3:17 PM

## 2023-02-17 ENCOUNTER — E-VISIT (OUTPATIENT)
Dept: URGENT CARE | Facility: CLINIC | Age: 53
End: 2023-02-17
Payer: COMMERCIAL

## 2023-02-17 DIAGNOSIS — R10.2 PELVIC PAIN IN FEMALE: Primary | ICD-10-CM

## 2023-02-17 PROCEDURE — 99207 PR NON-BILLABLE SERV PER CHARTING: CPT | Performed by: NURSE PRACTITIONER

## 2023-02-17 NOTE — PATIENT INSTRUCTIONS
Dear Mary Gutierrez,    We are sorry you are not feeling well. Based on the responses you provided, it is recommended that you be seen in-person in urgent care so we can better evaluate your symptoms. Please click here to find the nearest urgent care location to you.   You will not be charged for this Visit. Thank you for trusting us with your care.    CRISTAL Milner CNP

## 2023-02-18 ENCOUNTER — OFFICE VISIT (OUTPATIENT)
Dept: FAMILY MEDICINE | Facility: OTHER | Age: 53
End: 2023-02-18
Attending: NURSE PRACTITIONER
Payer: COMMERCIAL

## 2023-02-18 VITALS
OXYGEN SATURATION: 98 % | HEART RATE: 128 BPM | WEIGHT: 151.1 LBS | RESPIRATION RATE: 12 BRPM | DIASTOLIC BLOOD PRESSURE: 78 MMHG | SYSTOLIC BLOOD PRESSURE: 118 MMHG | BODY MASS INDEX: 26.77 KG/M2 | TEMPERATURE: 98.2 F

## 2023-02-18 DIAGNOSIS — R35.0 URINARY FREQUENCY: Primary | ICD-10-CM

## 2023-02-18 DIAGNOSIS — N30.01 ACUTE CYSTITIS WITH HEMATURIA: ICD-10-CM

## 2023-02-18 DIAGNOSIS — R30.9 URINARY PAIN: ICD-10-CM

## 2023-02-18 DIAGNOSIS — R39.15 URINARY URGENCY: ICD-10-CM

## 2023-02-18 LAB
ALBUMIN UR-MCNC: NEGATIVE MG/DL
APPEARANCE UR: CLEAR
BACTERIA #/AREA URNS HPF: ABNORMAL /HPF
BILIRUB UR QL STRIP: NEGATIVE
COLOR UR AUTO: YELLOW
GLUCOSE UR STRIP-MCNC: NEGATIVE MG/DL
HGB UR QL STRIP: ABNORMAL
KETONES UR STRIP-MCNC: NEGATIVE MG/DL
LEUKOCYTE ESTERASE UR QL STRIP: ABNORMAL
NITRATE UR QL: NEGATIVE
PH UR STRIP: 5.5 [PH] (ref 5–9)
RBC URINE: 59 /HPF
SP GR UR STRIP: <=1.005 (ref 1–1.03)
UROBILINOGEN UR STRIP-MCNC: NORMAL MG/DL
WBC CLUMPS #/AREA URNS HPF: PRESENT /HPF
WBC URINE: >182 /HPF

## 2023-02-18 PROCEDURE — 81001 URINALYSIS AUTO W/SCOPE: CPT | Mod: ZL | Performed by: NURSE PRACTITIONER

## 2023-02-18 PROCEDURE — 99214 OFFICE O/P EST MOD 30 MIN: CPT | Performed by: NURSE PRACTITIONER

## 2023-02-18 PROCEDURE — 87086 URINE CULTURE/COLONY COUNT: CPT | Mod: ZL | Performed by: NURSE PRACTITIONER

## 2023-02-18 RX ORDER — NITROFURANTOIN 25; 75 MG/1; MG/1
100 CAPSULE ORAL 2 TIMES DAILY
Qty: 10 CAPSULE | Refills: 0 | Status: SHIPPED | OUTPATIENT
Start: 2023-02-18 | End: 2023-02-18

## 2023-02-18 RX ORDER — NITROFURANTOIN 25; 75 MG/1; MG/1
100 CAPSULE ORAL 2 TIMES DAILY
Qty: 14 CAPSULE | Refills: 0 | Status: SHIPPED | OUTPATIENT
Start: 2023-02-18 | End: 2023-02-25

## 2023-02-18 ASSESSMENT — ENCOUNTER SYMPTOMS
ARTHRALGIAS: 0
MUSCULOSKELETAL NEGATIVE: 1
DIFFICULTY URINATING: 1
COUGH: 0
RESPIRATORY NEGATIVE: 1
EYES NEGATIVE: 1
GASTROINTESTINAL NEGATIVE: 1
CONSTITUTIONAL NEGATIVE: 1
FLANK PAIN: 0
BACK PAIN: 0
DYSURIA: 0
FREQUENCY: 1

## 2023-02-18 ASSESSMENT — PAIN SCALES - GENERAL: PAINLEVEL: NO PAIN (0)

## 2023-02-18 NOTE — PROGRESS NOTES
"Chief Complaint   Patient presents with     Urinary Problem       Initial LMP  (LMP Unknown)  Estimated body mass index is 25.86 kg/m  as calculated from the following:    Height as of 12/8/21: 1.6 m (5' 3\").    Weight as of 6/28/22: 66.2 kg (146 lb).  FOOD SECURITY SCREENING QUESTIONS  Hunger Vital Signs:  Within the past 12 months we worried whether our food would run out before we got money to buy more. Never  Within the past 12 months the food we bought just didn't last and we didn't have money to get more. Never        Brian lEy, MARY ANNN    "

## 2023-02-18 NOTE — PROGRESS NOTES
Mary Gutierrez  1970    ASSESSMENT/PLAN:   1. Urinary frequency  2. Urinary urgency  3. Urinary pain  - UA with Microscopic reflex to Culture  - Urine Culture  4. Acute cystitis with hematuria  - nitroFURantoin macrocrystal-monohydrate (MACROBID) 100 MG capsule; Take 1 capsule (100 mg) by mouth 2 times daily for 7 days  Dispense: 10 capsule; Refill: 0    Based on symptoms, recommend proceeding with urinalysis which returned showing large amount of blood, leukocyte esterase and white blood cells greater than 182.  Recommend treating with Macrobid twice a day for 5 days.  Urine culture is pending, she will be notified of results if antibiotic needs to be changed.  Patient verbalizes understanding.    Patient agrees with plan of care and verbalizes understating. AVS printed. Patient education provided verbally and written instructions provided as requested. Patient made aware of emergent sings and symptoms to monitor for and when to seek additional care/follow up.     SUBJECTIVE:   CHIEF COMPLAINT/ REASON FOR VISIT  Patient presents with:  Urinary Problem     HISTORY OF PRESENT ILLNESS  Mary Gutierrez is a pleasant 52 year old female presents to [facility] clinic today with concerns of possible UTI.    Patient states about 3 to 4 days ago she developed urinary urgency.  She was going to the bathroom frequently however was putting out normal volumes of urine.  Feels constant pressure in her bladder like she needs to have a bowel movement or urinate.  She is also endorsing bladder spasms.  States her vaginal area feels raw.  No hematuria, dysuria.  No vaginal discharge, change in vaginal itching or odor.  No lower abdominal or flank pain.  She states she recently traveled to [place], and states she often will get a UTI when she travels.    I have reviewed the nursing notes.  I have reviewed allergies, medication list, problem list, and past medical history.    REVIEW OF SYSTEMS  Review of Systems   Constitutional:  Negative.    HENT: Negative.    Eyes: Negative.    Respiratory: Negative.  Negative for cough.    Gastrointestinal: Negative.    Genitourinary: Positive for difficulty urinating, dyspareunia, frequency and urgency. Negative for dysuria, flank pain, pelvic pain, vaginal bleeding, vaginal discharge and vaginal pain.   Musculoskeletal: Negative.  Negative for arthralgias and back pain.        VITAL SIGNS  Vitals:    02/18/23 0940   BP: 118/78   Pulse: (!) 128   Resp: 12   Temp: 98.2  F (36.8  C)   TempSrc: Tympanic   SpO2: 98%   Weight: 68.5 kg (151 lb 1.6 oz)      Body mass index is 26.77 kg/m .    OBJECTIVE:   PHYSICAL EXAM  Physical Exam  Vitals reviewed.   Constitutional:       Appearance: Normal appearance. She is not ill-appearing or toxic-appearing.   HENT:      Head: Normocephalic and atraumatic.      Nose: Nose normal.   Eyes:      Conjunctiva/sclera: Conjunctivae normal.   Cardiovascular:      Rate and Rhythm: Normal rate and regular rhythm.      Pulses: Normal pulses.      Heart sounds: Normal heart sounds.   Pulmonary:      Effort: Pulmonary effort is normal.      Breath sounds: Normal breath sounds.   Abdominal:      General: Bowel sounds are normal. There is no distension.      Palpations: Abdomen is soft.      Tenderness: There is no abdominal tenderness. There is no right CVA tenderness or left CVA tenderness.   Skin:     Findings: No rash.   Neurological:      General: No focal deficit present.      Mental Status: She is alert and oriented to person, place, and time.   Psychiatric:         Mood and Affect: Mood normal.         Behavior: Behavior normal.         Thought Content: Thought content normal.         Judgment: Judgment normal.        DIAGNOSTICS  Results for orders placed or performed in visit on 02/18/23   UA with Microscopic reflex to Culture     Status: Abnormal    Specimen: Urine, Midstream   Result Value Ref Range    Color Urine Yellow Colorless, Straw, Light Yellow, Yellow    Appearance  Urine Clear Clear    Glucose Urine Negative Negative mg/dL    Bilirubin Urine Negative Negative    Ketones Urine Negative Negative mg/dL    Specific Gravity Urine <=1.005 1.005 - 1.030    Blood Urine Large (A) Negative    pH Urine 5.5 5.0 - 9.0    Protein Albumin Urine Negative Negative mg/dL    Urobilinogen Urine Normal Normal, 2.0 mg/dL    Nitrite Urine Negative Negative    Leukocyte Esterase Urine Large (A) Negative    Bacteria Urine Few (A) None Seen /HPF    WBC Clumps Urine Present (A) None Seen /HPF    RBC Urine 59 (H) <=2 /HPF    WBC Urine >182 (H) <=5 /HPF    Narrative    Urine Culture ordered based on laboratory criteria        Lady Uribe NP  [facility]

## 2023-02-20 LAB — BACTERIA UR CULT: ABNORMAL

## 2023-03-15 PROBLEM — M54.50 LOW BACK PAIN: Status: ACTIVE | Noted: 2022-05-10

## 2023-03-15 PROBLEM — E66.3 OVERWEIGHT: Status: ACTIVE | Noted: 2022-05-10

## 2023-03-15 PROBLEM — M54.2 NECK PAIN: Status: ACTIVE | Noted: 2022-05-10

## 2023-03-15 PROBLEM — L30.9 ECZEMA OF HAND: Status: ACTIVE | Noted: 2022-05-10

## 2023-03-15 PROBLEM — G47.00 INSOMNIA: Status: ACTIVE | Noted: 2022-05-10

## 2023-03-15 PROBLEM — R92.30 BREAST DENSITY: Status: ACTIVE | Noted: 2022-05-10

## 2023-03-20 ASSESSMENT — ENCOUNTER SYMPTOMS
HEARTBURN: 0
CHILLS: 0
CONSTIPATION: 0
DIARRHEA: 0
JOINT SWELLING: 0
NERVOUS/ANXIOUS: 0
NAUSEA: 0
HEADACHES: 1
ABDOMINAL PAIN: 0
WEAKNESS: 0
EYE PAIN: 0
ARTHRALGIAS: 0
COUGH: 0
SORE THROAT: 0
FEVER: 0
HEMATOCHEZIA: 0
MYALGIAS: 1
PARESTHESIAS: 0
HEMATURIA: 0
BREAST MASS: 0
DYSURIA: 0
DIZZINESS: 0
SHORTNESS OF BREATH: 0
FREQUENCY: 0
PALPITATIONS: 0

## 2023-03-22 ENCOUNTER — OFFICE VISIT (OUTPATIENT)
Dept: FAMILY MEDICINE | Facility: OTHER | Age: 53
End: 2023-03-22
Attending: FAMILY MEDICINE
Payer: COMMERCIAL

## 2023-03-22 VITALS
HEIGHT: 63 IN | OXYGEN SATURATION: 99 % | TEMPERATURE: 97.8 F | HEART RATE: 115 BPM | BODY MASS INDEX: 26.86 KG/M2 | WEIGHT: 151.6 LBS | DIASTOLIC BLOOD PRESSURE: 66 MMHG | SYSTOLIC BLOOD PRESSURE: 122 MMHG | RESPIRATION RATE: 16 BRPM

## 2023-03-22 DIAGNOSIS — Z00.00 HEALTH CARE MAINTENANCE: ICD-10-CM

## 2023-03-22 DIAGNOSIS — E78.2 MIXED HYPERLIPIDEMIA: ICD-10-CM

## 2023-03-22 DIAGNOSIS — G44.219 EPISODIC TENSION-TYPE HEADACHE, NOT INTRACTABLE: ICD-10-CM

## 2023-03-22 DIAGNOSIS — L30.1 DYSHIDROTIC ECZEMA: ICD-10-CM

## 2023-03-22 DIAGNOSIS — I10 ESSENTIAL HYPERTENSION: ICD-10-CM

## 2023-03-22 DIAGNOSIS — M54.2 NECK PAIN: Primary | ICD-10-CM

## 2023-03-22 PROCEDURE — 99396 PREV VISIT EST AGE 40-64: CPT | Performed by: FAMILY MEDICINE

## 2023-03-22 RX ORDER — LISINOPRIL 20 MG/1
20 TABLET ORAL DAILY
Qty: 90 TABLET | Refills: 3 | Status: SHIPPED | OUTPATIENT
Start: 2023-03-22 | End: 2024-06-04 | Stop reason: DRUGHIGH

## 2023-03-22 RX ORDER — HYDROCODONE BITARTRATE AND ACETAMINOPHEN 5; 325 MG/1; MG/1
1-2 TABLET ORAL EVERY 6 HOURS PRN
Qty: 60 TABLET | Refills: 0 | Status: SHIPPED | OUTPATIENT
Start: 2023-03-22 | End: 2024-04-03

## 2023-03-22 RX ORDER — CYCLOBENZAPRINE HCL 10 MG
10 TABLET ORAL 3 TIMES DAILY PRN
Qty: 90 TABLET | Refills: 1 | Status: SHIPPED | OUTPATIENT
Start: 2023-03-22 | End: 2023-08-14

## 2023-03-22 ASSESSMENT — ENCOUNTER SYMPTOMS
DYSURIA: 0
CHILLS: 0
BREAST MASS: 0
SORE THROAT: 0
DIZZINESS: 0
PALPITATIONS: 0
JOINT SWELLING: 0
PARESTHESIAS: 0
HEMATOCHEZIA: 0
NERVOUS/ANXIOUS: 0
NAUSEA: 0
ARTHRALGIAS: 0
DIARRHEA: 0
EYE PAIN: 0
HEARTBURN: 0
HEADACHES: 1
FREQUENCY: 0
SHORTNESS OF BREATH: 0
MYALGIAS: 1
FEVER: 0
HEMATURIA: 0
WEAKNESS: 0
ABDOMINAL PAIN: 0
COUGH: 0
CONSTIPATION: 0

## 2023-03-22 ASSESSMENT — PAIN SCALES - GENERAL: PAINLEVEL: NO PAIN (0)

## 2023-03-22 NOTE — NURSING NOTE
"Chief Complaint   Patient presents with     Physical     yearly       Initial /66 (BP Location: Right arm, Patient Position: Sitting, Cuff Size: Adult Small)   Pulse 115   Temp 97.8  F (36.6  C) (Tympanic)   Resp 16   Ht 1.6 m (5' 3\")   Wt 68.8 kg (151 lb 9.6 oz)   LMP  (LMP Unknown)   SpO2 99%   BMI 26.85 kg/m   Estimated body mass index is 26.85 kg/m  as calculated from the following:    Height as of this encounter: 1.6 m (5' 3\").    Weight as of this encounter: 68.8 kg (151 lb 9.6 oz).  Medication Reconciliation: complete    Marlyn Daley LPN    Advance Care Directive reviewed    "

## 2023-03-22 NOTE — PROGRESS NOTES
"  Assessment & Plan       ICD-10-CM    1. Neck pain  M54.2 Physical Therapy Referral      2. Essential hypertension  I10 lisinopril (ZESTRIL) 20 MG tablet     Physical Therapy Referral     Basic Metabolic Panel     Hemoglobin A1c      3. Episodic tension-type headache, not intractable  G44.219 HYDROcodone-acetaminophen (NORCO) 5-325 MG tablet     cyclobenzaprine (FLEXERIL) 10 MG tablet     Physical Therapy Referral      4. Health care maintenance  Z00.00 MA Screen Bilateral w/Poncho      5. Mixed hyperlipidemia  E78.2 Lipid Panel      6. Dyshidrotic eczema  L30.1 Adult Allergy/Asthma Referral        Plan for mammogram in May, 1 year following her breast imaging at [facility].  Patient feels like she is getting back on track with her headaches and neck pain.  However, referral placed for PT and case additional evaluation is indicated.  Medications reviewed and refilled.  Reviewed current recommendations regarding cervical cancer screening, next Pap smear due in 2026.  patient has had multiple Derm evaluations, including at [facility], without notable improvement in her dyshidrotic eczema.  Patient is referred to the allergist for additional testing to see if causative agent can be identified.     BMI:   Estimated body mass index is 26.85 kg/m  as calculated from the following:    Height as of this encounter: 1.6 m (5' 3\").    Weight as of this encounter: 68.8 kg (151 lb 9.6 oz).         No follow-ups on file.    Jemima Mendez MD  [facility]    Subjective   Mary is a 53 year old, presenting for the following health issues:  Physical (yearly)    Additional Questions 3/22/2023   Roomed by amber   Accompanied by self     Healthy Habits:     Getting at least 3 servings of Calcium per day:  Yes    Bi-annual eye exam:  Yes    Dental care twice a year:  Yes    Sleep apnea or symptoms of sleep apnea:  None    Diet:  Regular (no restrictions)    Frequency of exercise:  2-3 days/week    Duration of exercise:  Less " "than 15 minutes    Taking medications regularly:  Yes    Medication side effects:  Not applicable    PHQ-2 Total Score: 0    Additional concerns today:  No       Review of Systems   Constitutional: Negative for chills and fever.   HENT: Negative for congestion, ear pain, hearing loss and sore throat.    Eyes: Negative for pain and visual disturbance.   Respiratory: Negative for cough and shortness of breath.    Cardiovascular: Negative for chest pain, palpitations and peripheral edema.   Gastrointestinal: Negative for abdominal pain, constipation, diarrhea, heartburn, hematochezia and nausea.   Breasts:  Negative for tenderness, breast mass and discharge.   Genitourinary: Negative for dysuria, frequency, genital sores, hematuria, pelvic pain, urgency, vaginal bleeding and vaginal discharge.   Musculoskeletal: Positive for myalgias. Negative for arthralgias and joint swelling.   Skin: Positive for rash.   Neurological: Positive for headaches. Negative for dizziness, weakness and paresthesias.   Psychiatric/Behavioral: Negative for mood changes. The patient is not nervous/anxious.             Objective    /66 (BP Location: Right arm, Patient Position: Sitting, Cuff Size: Adult Small)   Pulse 115   Temp 97.8  F (36.6  C) (Tympanic)   Resp 16   Ht 1.6 m (5' 3\")   Wt 68.8 kg (151 lb 9.6 oz)   LMP  (LMP Unknown)   SpO2 99%   BMI 26.85 kg/m    Body mass index is 26.85 kg/m .  Physical Exam  Constitutional:       Appearance: She is well-developed.   Eyes:      Conjunctiva/sclera: Conjunctivae normal.   Neck:      Thyroid: No thyromegaly.   Cardiovascular:      Rate and Rhythm: Normal rate and regular rhythm.      Heart sounds: Normal heart sounds. No murmur heard.  Pulmonary:      Effort: Pulmonary effort is normal. No respiratory distress.      Breath sounds: Normal breath sounds.   Abdominal:      Palpations: Abdomen is soft.   Lymphadenopathy:      Cervical: No cervical adenopathy.   Skin:     Findings: No " rash.   Neurological:      Mental Status: She is alert and oriented to person, place, and time.

## 2023-03-27 ENCOUNTER — TELEPHONE (OUTPATIENT)
Dept: FAMILY MEDICINE | Facility: OTHER | Age: 53
End: 2023-03-27
Payer: COMMERCIAL

## 2023-03-27 NOTE — TELEPHONE ENCOUNTER
Patient approved for shingles vaccine (2 doses 2-6 months apart). Routing to outreach team to contact patient to schedule nurse only appointment for first dose.      NGA CANAS RN on 3/27/2023 at 7:52 AM

## 2023-04-05 NOTE — TELEPHONE ENCOUNTER
Called and spoke with patient. Patient scheduled for nurse only appointment on 4/17/23 at 1330.     NGA CANAS RN on 4/5/2023 at 3:17 PM

## 2023-04-15 ENCOUNTER — HEALTH MAINTENANCE LETTER (OUTPATIENT)
Age: 53
End: 2023-04-15

## 2023-04-17 ENCOUNTER — ALLIED HEALTH/NURSE VISIT (OUTPATIENT)
Dept: FAMILY MEDICINE | Facility: OTHER | Age: 53
End: 2023-04-17
Attending: FAMILY MEDICINE
Payer: COMMERCIAL

## 2023-04-17 DIAGNOSIS — Z23 NEED FOR ZOSTER VACCINATION: Primary | ICD-10-CM

## 2023-04-17 DIAGNOSIS — Z23 NEED FOR VACCINATION: ICD-10-CM

## 2023-04-17 PROCEDURE — 90750 HZV VACC RECOMBINANT IM: CPT

## 2023-04-17 PROCEDURE — 90471 IMMUNIZATION ADMIN: CPT

## 2023-04-17 NOTE — PROGRESS NOTES
Immunization Documentation - Shingrix #1  Verified patient's first and last name, and . Stated reason for visit today is to receive the Shingrix vaccine. Denied any concerns with previous immunizations. Allergies reviewed. VIS handout(s) reviewed and given to take home. Shingrix prepared and administered IM per standing order. Administration documented in IMMUNIZATIONS (see flowsheet and order for further information). Patient Instructed to wait in lobby for 15 minutes post-injection and notify staff immediately of any reaction.     NGA CANAS RN ....................  2023   1:31 PM

## 2023-05-25 ENCOUNTER — MYC REFILL (OUTPATIENT)
Dept: FAMILY MEDICINE | Facility: OTHER | Age: 53
End: 2023-05-25
Payer: COMMERCIAL

## 2023-05-25 DIAGNOSIS — E78.2 MIXED HYPERLIPIDEMIA: Primary | ICD-10-CM

## 2023-05-30 ENCOUNTER — HOSPITAL ENCOUNTER (OUTPATIENT)
Dept: MAMMOGRAPHY | Facility: OTHER | Age: 53
Discharge: HOME | End: 2023-05-30
Attending: FAMILY MEDICINE | Admitting: FAMILY MEDICINE
Payer: COMMERCIAL

## 2023-05-30 DIAGNOSIS — Z00.00 HEALTH CARE MAINTENANCE: ICD-10-CM

## 2023-05-30 DIAGNOSIS — Z12.31 VISIT FOR SCREENING MAMMOGRAM: ICD-10-CM

## 2023-05-30 PROCEDURE — 77067 SCR MAMMO BI INCL CAD: CPT

## 2023-05-31 RX ORDER — ROSUVASTATIN CALCIUM 10 MG/1
10 TABLET, COATED ORAL DAILY
Qty: 90 TABLET | Refills: 0 | Status: SHIPPED | OUTPATIENT
Start: 2023-05-31 | End: 2023-09-07

## 2023-05-31 NOTE — TELEPHONE ENCOUNTER
[facility] sent Rx request for the following:    rosuvastatin (CRESTOR) 10 MG tablet  Last Prescription Date:   Historical  Last Fill Qty/Refills:         , R-    Last Office Visit:              3/22/23   Future Office visit:           None     In clinical absence of patient's primary, Jemima Mendez, patient is requesting that this message be sent to the covering provider for consideration please.    Melanie Fu RN on 5/31/2023 at 10:37 AM

## 2023-06-06 ENCOUNTER — LAB (OUTPATIENT)
Dept: LAB | Facility: OTHER | Age: 53
End: 2023-06-06
Payer: COMMERCIAL

## 2023-06-06 DIAGNOSIS — E78.2 MIXED HYPERLIPIDEMIA: ICD-10-CM

## 2023-06-06 DIAGNOSIS — I10 ESSENTIAL HYPERTENSION: ICD-10-CM

## 2023-06-06 LAB
ANION GAP SERPL CALCULATED.3IONS-SCNC: 11 MMOL/L (ref 7–15)
BUN SERPL-MCNC: 21.6 MG/DL (ref 6–20)
CALCIUM SERPL-MCNC: 9.6 MG/DL (ref 8.6–10)
CHLORIDE SERPL-SCNC: 105 MMOL/L (ref 98–107)
CHOLEST SERPL-MCNC: 190 MG/DL
CREAT SERPL-MCNC: 0.92 MG/DL (ref 0.51–0.95)
DEPRECATED HCO3 PLAS-SCNC: 25 MMOL/L (ref 22–29)
GFR SERPL CREATININE-BSD FRML MDRD: 75 ML/MIN/1.73M2
GLUCOSE SERPL-MCNC: 92 MG/DL (ref 70–99)
HBA1C MFR BLD: 5.3 % (ref 4–6.2)
HDLC SERPL-MCNC: 50 MG/DL
LDLC SERPL CALC-MCNC: 105 MG/DL
NONHDLC SERPL-MCNC: 140 MG/DL
POTASSIUM SERPL-SCNC: 4.2 MMOL/L (ref 3.4–5.3)
SODIUM SERPL-SCNC: 141 MMOL/L (ref 136–145)
TRIGL SERPL-MCNC: 175 MG/DL

## 2023-06-06 PROCEDURE — 80061 LIPID PANEL: CPT | Mod: ZL

## 2023-06-06 PROCEDURE — 83036 HEMOGLOBIN GLYCOSYLATED A1C: CPT | Mod: ZL

## 2023-06-06 PROCEDURE — 36415 COLL VENOUS BLD VENIPUNCTURE: CPT | Mod: ZL

## 2023-06-06 PROCEDURE — 80048 BASIC METABOLIC PNL TOTAL CA: CPT | Mod: ZL

## 2023-08-09 DIAGNOSIS — G44.219 EPISODIC TENSION-TYPE HEADACHE, NOT INTRACTABLE: ICD-10-CM

## 2023-08-14 RX ORDER — CYCLOBENZAPRINE HCL 10 MG
10 TABLET ORAL 3 TIMES DAILY PRN
Qty: 90 TABLET | Refills: 1 | Status: SHIPPED | OUTPATIENT
Start: 2023-08-14

## 2023-08-14 NOTE — TELEPHONE ENCOUNTER
Routing refill request to provider for review/approval because:    LOV: 3/22/23    Pamela Reeves RN on 8/14/2023 at 11:30 AM

## 2023-08-21 ENCOUNTER — ALLIED HEALTH/NURSE VISIT (OUTPATIENT)
Dept: FAMILY MEDICINE | Facility: OTHER | Age: 53
End: 2023-08-21
Attending: FAMILY MEDICINE
Payer: COMMERCIAL

## 2023-08-21 DIAGNOSIS — Z23 NEED FOR ZOSTER VACCINATION: Primary | ICD-10-CM

## 2023-08-21 PROCEDURE — 90471 IMMUNIZATION ADMIN: CPT

## 2023-08-21 PROCEDURE — 90750 HZV VACC RECOMBINANT IM: CPT

## 2023-08-21 NOTE — PROGRESS NOTES
Immunization Documentation - Shingrix #2  Verified patient's first and last name, and . Stated reason for visit today is to receive the Shingrix vaccine. Denied any concerns with previous immunizations. Allergies reviewed. VIS handout provided at previous appointment. Shingrix prepared and administered IM per standing order. Administration documented in IMMUNIZATIONS (see flowsheet and order for further information).    NGA CANAS RN ....................  2023   1:41 PM

## 2023-09-01 DIAGNOSIS — E78.2 MIXED HYPERLIPIDEMIA: ICD-10-CM

## 2023-09-07 RX ORDER — ROSUVASTATIN CALCIUM 10 MG/1
10 TABLET, COATED ORAL DAILY
Qty: 90 TABLET | Refills: 0 | Status: SHIPPED | OUTPATIENT
Start: 2023-09-07 | End: 2023-10-10

## 2023-09-07 NOTE — TELEPHONE ENCOUNTER
Last Prescription Date: 5/31/2023  Last Qty/Refills: 90 / R-0  Last Office Visit: 3/22/2023  Future Office Visit: None    Stephani Ramey RN on 9/7/2023 at 2:30 PM     Requested Prescriptions   Pending Prescriptions Disp Refills    rosuvastatin (CRESTOR) 10 MG tablet [Pharmacy Med Name: ROSUVASTATIN 10MG TABLET] 90 tablet 0     Sig: TAKE 1 TABLET BY MOUTH DAILY       [facility] Protocol Passed - 9/1/2023  1:14 AM

## 2023-10-09 DIAGNOSIS — E78.2 MIXED HYPERLIPIDEMIA: Primary | ICD-10-CM

## 2023-10-10 RX ORDER — ROSUVASTATIN CALCIUM 10 MG/1
10 TABLET, COATED ORAL DAILY
Qty: 90 TABLET | Refills: 1 | Status: SHIPPED | OUTPATIENT
Start: 2023-10-10 | End: 2024-04-03

## 2023-10-10 NOTE — TELEPHONE ENCOUNTER
[facility] sent Rx request for the following:      Requested Prescriptions   Pending Prescriptions Disp Refills    rosuvastatin (CRESTOR) 10 MG tablet [Pharmacy Med Name: ROSUVASTATIN 10MG TABLET] 90 tablet 1     Sig: TAKE 1 TABLET BY MOUTH DAILY     Last Prescription Date:   9/7/23  Last Fill Qty/Refills:         90, R-0    Last Office Visit:              3/22/23   Future Office visit:           None    Prescription approved per [facility] Refill Protocol.  Aurora Bonilla RN on 10/10/2023 at 11:53 AM

## 2024-01-17 ENCOUNTER — OFFICE VISIT (OUTPATIENT)
Dept: FAMILY MEDICINE | Facility: OTHER | Age: 54
End: 2024-01-17
Attending: NURSE PRACTITIONER
Payer: COMMERCIAL

## 2024-01-17 VITALS
OXYGEN SATURATION: 98 % | TEMPERATURE: 98.3 F | BODY MASS INDEX: 26.4 KG/M2 | SYSTOLIC BLOOD PRESSURE: 144 MMHG | WEIGHT: 154.6 LBS | DIASTOLIC BLOOD PRESSURE: 92 MMHG | HEIGHT: 64 IN | HEART RATE: 89 BPM | RESPIRATION RATE: 16 BRPM

## 2024-01-17 DIAGNOSIS — T30.0 BURN, THIRD DEGREE: Primary | ICD-10-CM

## 2024-01-17 PROCEDURE — 250N000009 HC RX 250

## 2024-01-17 PROCEDURE — 99213 OFFICE O/P EST LOW 20 MIN: CPT

## 2024-01-17 RX ORDER — GINSENG 100 MG
1 CAPSULE ORAL ONCE
Status: COMPLETED | OUTPATIENT
Start: 2024-01-17 | End: 2024-01-17

## 2024-01-17 RX ORDER — SILVER SULFADIAZINE 10 MG/G
CREAM TOPICAL DAILY
Qty: 30 G | Refills: 1 | Status: SHIPPED | OUTPATIENT
Start: 2024-01-17 | End: 2024-02-16

## 2024-01-17 RX ADMIN — BACITRACIN 1 G: 500 OINTMENT TOPICAL at 10:57
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
## 2024-04-02 ENCOUNTER — HOSPITAL ENCOUNTER (OUTPATIENT)
Dept: GENERAL RADIOLOGY | Facility: OTHER | Age: 54
Discharge: HOME | End: 2024-04-02
Attending: FAMILY MEDICINE
Payer: COMMERCIAL

## 2024-04-02 ENCOUNTER — OFFICE VISIT (OUTPATIENT)
Dept: FAMILY MEDICINE | Facility: OTHER | Age: 54
End: 2024-04-02
Attending: FAMILY MEDICINE
Payer: COMMERCIAL

## 2024-04-02 VITALS
HEART RATE: 106 BPM | SYSTOLIC BLOOD PRESSURE: 122 MMHG | BODY MASS INDEX: 26.11 KG/M2 | OXYGEN SATURATION: 98 % | TEMPERATURE: 98.2 F | WEIGHT: 150.2 LBS | DIASTOLIC BLOOD PRESSURE: 88 MMHG | RESPIRATION RATE: 22 BRPM

## 2024-04-02 DIAGNOSIS — R05.3 CHRONIC COUGH: ICD-10-CM

## 2024-04-02 DIAGNOSIS — R05.3 CHRONIC COUGH: Primary | ICD-10-CM

## 2024-04-02 PROCEDURE — 99213 OFFICE O/P EST LOW 20 MIN: CPT | Performed by: FAMILY MEDICINE

## 2024-04-02 PROCEDURE — 71046 X-RAY EXAM CHEST 2 VIEWS: CPT

## 2024-04-02 RX ORDER — CLOBETASOL PROPIONATE 0.5 MG/G
OINTMENT TOPICAL
COMMUNITY
Start: 2024-01-03

## 2024-04-02 RX ORDER — CODEINE PHOSPHATE AND GUAIFENESIN 10; 100 MG/5ML; MG/5ML
1-2 SOLUTION ORAL EVERY 4 HOURS PRN
Qty: 180 ML | Refills: 1 | Status: SHIPPED | OUTPATIENT
Start: 2024-04-02 | End: 2024-06-04

## 2024-04-02 RX ORDER — ROSUVASTATIN CALCIUM 20 MG/1
20 TABLET, COATED ORAL DAILY
COMMUNITY
Start: 2024-01-04

## 2024-04-02 ASSESSMENT — PAIN SCALES - GENERAL: PAINLEVEL: MILD PAIN (3)

## 2024-04-02 NOTE — NURSING NOTE
Patient here for cough and sore throat for 2 weeks. She says the outside is sore when palpitated. Medication Reconciliation: complete.    Sandra Enrique LPN  4/2/2024 8:59 AM

## 2024-04-03 ASSESSMENT — ENCOUNTER SYMPTOMS: COUGH: 1

## 2024-04-03 NOTE — PROGRESS NOTES
SUBJECTIVE:   Mary Gutierrez is a 54 year old female who presents to clinic today for the following health issues: Cough congestion    Patient arrives here for cough congestion.  He reports has been going on for 2 weeks.  No fevers or chills.  No exposures that she is aware of    Cough    History of Present Illness       Reason for visit:  Cough  sore throat  Symptom onset:  1-2 weeks ago  Symptoms include:  Deep cough  sore throat  Symptom intensity:  Moderate  Symptom progression:  Staying the same  Had these symptoms before:  No  What makes it worse:  Worsens as day goes on  What makes it better:  Not really    She eats 2-3 servings of fruits and vegetables daily.She consumes 1 sweetened beverage(s) daily.She exercises with enough effort to increase her heart rate 30 to 60 minutes per day.  She exercises with enough effort to increase her heart rate 3 or less days per week.   She is taking medications regularly.        Patient Active Problem List    Diagnosis Date Noted    Breast density 05/10/2022     Priority: Medium    Eczema of hand 05/10/2022     Priority: Medium    Insomnia 05/10/2022     Priority: Medium    Low back pain 05/10/2022     Priority: Medium    Neck pain 05/10/2022     Priority: Medium    Overweight 05/10/2022     Priority: Medium    Essential hypertension 07/20/2018     Priority: Medium    Episodic tension type headache 02/01/2018     Priority: Medium    Acquired deformity of ankle and foot 02/01/2018     Priority: Medium    Hyperlipidemia 03/14/2012     Priority: Medium    Family history of coronary artery disease 03/14/2012     Priority: Medium    Other affections of shoulder region, not elsewhere classified 08/29/2011     Priority: Medium     Past Medical History:   Diagnosis Date    Abnormal finding of blood chemistry     Discovered during infertility work up    Encounter for assisted reproductive fertility procedure cycle     2004    Parvovirus infection     5/14/2012,confirmed with  antibody; complicated by edema, lft increase, bp increase and  brief renal worsening; resolved by     Personal history of other diseases of the female genital tract      1 para 1-0-0-2 - twin gestation, status post IVF    Presence of (intrauterine) contraceptive device     05,Mirena IUD placed replaced 2010      Past Surgical History:   Procedure Laterality Date     SECTION       section - breech/breech twins    COLONOSCOPY      ,[place], normal    COLONOSCOPY  2018    follow up  family history colon cancer Dr. Fair    OTHER SURGICAL HISTORY      [id number],ORAL SURGERY,[place] teeth    OTHER SURGICAL HISTORY      [id number],OTHER,IVF       Review of Systems   Respiratory:  Positive for cough.         OBJECTIVE:     /88   Pulse 106   Temp 98.2  F (36.8  C)   Resp 22   Wt 68.1 kg (150 lb 3.2 oz)   LMP  (LMP Unknown)   SpO2 98%   BMI 26.11 kg/m    Body mass index is 26.11 kg/m .  Physical Exam  Constitutional:       Appearance: Normal appearance.   Cardiovascular:      Rate and Rhythm: Normal rate.   Skin:     General: Skin is warm.   Neurological:      Mental Status: She is alert.   Psychiatric:         Mood and Affect: Mood normal.         Thought Content: Thought content normal.         Diagnostic Test Results:  No results found for this or any previous visit (from the past 24 hour(s)).    ASSESSMENT/PLAN:         (R05.3) Chronic cough  (primary encounter diagnosis)  Comment: Chest x-ray unremarkable  Plan: XR Chest 2 Views, guaiFENesin-codeine         (ROBITUSSIN AC) 100-10 MG/5ML solution        Likely viral in origin.  Advised to call next week if the cough suppressant does not seem to help would consider antibiotic use      Dilshad Aragon MD  [facility]

## 2024-06-04 ENCOUNTER — OFFICE VISIT (OUTPATIENT)
Dept: FAMILY MEDICINE | Facility: OTHER | Age: 54
End: 2024-06-04
Attending: NURSE PRACTITIONER
Payer: COMMERCIAL

## 2024-06-04 VITALS
OXYGEN SATURATION: 100 % | TEMPERATURE: 97.6 F | BODY MASS INDEX: 25.13 KG/M2 | WEIGHT: 147.2 LBS | SYSTOLIC BLOOD PRESSURE: 134 MMHG | DIASTOLIC BLOOD PRESSURE: 90 MMHG | HEIGHT: 64 IN | HEART RATE: 78 BPM | RESPIRATION RATE: 14 BRPM

## 2024-06-04 DIAGNOSIS — E83.52 SERUM CALCIUM ELEVATED: ICD-10-CM

## 2024-06-04 DIAGNOSIS — E78.49 FAMILIAL HYPERLIPIDEMIA: ICD-10-CM

## 2024-06-04 DIAGNOSIS — R74.8 ELEVATED SERUM GGT LEVEL: ICD-10-CM

## 2024-06-04 DIAGNOSIS — I10 ESSENTIAL HYPERTENSION: ICD-10-CM

## 2024-06-04 DIAGNOSIS — R35.0 URINARY FREQUENCY: Primary | ICD-10-CM

## 2024-06-04 DIAGNOSIS — R79.82 ELEVATED C-REACTIVE PROTEIN (CRP): ICD-10-CM

## 2024-06-04 DIAGNOSIS — R79.89 ELEVATED FERRITIN: ICD-10-CM

## 2024-06-04 LAB
ALBUMIN UR-MCNC: NEGATIVE MG/DL
ANION GAP SERPL CALCULATED.3IONS-SCNC: 11 MMOL/L (ref 7–15)
APPEARANCE UR: CLEAR
BACTERIA #/AREA URNS HPF: ABNORMAL /HPF
BILIRUB UR QL STRIP: NEGATIVE
BUN SERPL-MCNC: 21.9 MG/DL (ref 6–20)
CALCIUM SERPL-MCNC: 10.3 MG/DL (ref 8.6–10)
CHLORIDE SERPL-SCNC: 101 MMOL/L (ref 98–107)
COLOR UR AUTO: ABNORMAL
CREAT SERPL-MCNC: 0.86 MG/DL (ref 0.51–0.95)
DEPRECATED HCO3 PLAS-SCNC: 27 MMOL/L (ref 22–29)
EGFRCR SERPLBLD CKD-EPI 2021: 81 ML/MIN/1.73M2
GLUCOSE SERPL-MCNC: 96 MG/DL (ref 70–99)
GLUCOSE UR STRIP-MCNC: NEGATIVE MG/DL
HGB UR QL STRIP: ABNORMAL
KETONES UR STRIP-MCNC: NEGATIVE MG/DL
LEUKOCYTE ESTERASE UR QL STRIP: NEGATIVE
MUCOUS THREADS #/AREA URNS LPF: PRESENT /LPF
NITRATE UR QL: NEGATIVE
PH UR STRIP: 6 [PH] (ref 5–9)
POTASSIUM SERPL-SCNC: 4.7 MMOL/L (ref 3.4–5.3)
RBC URINE: 1 /HPF
SODIUM SERPL-SCNC: 139 MMOL/L (ref 135–145)
SP GR UR STRIP: 1.01 (ref 1–1.03)
UROBILINOGEN UR STRIP-MCNC: NORMAL MG/DL
WBC URINE: 1 /HPF

## 2024-06-04 PROCEDURE — 36415 COLL VENOUS BLD VENIPUNCTURE: CPT | Mod: ZL | Performed by: NURSE PRACTITIONER

## 2024-06-04 PROCEDURE — 87086 URINE CULTURE/COLONY COUNT: CPT | Mod: ZL | Performed by: NURSE PRACTITIONER

## 2024-06-04 PROCEDURE — 99214 OFFICE O/P EST MOD 30 MIN: CPT | Performed by: NURSE PRACTITIONER

## 2024-06-04 PROCEDURE — 82374 ASSAY BLOOD CARBON DIOXIDE: CPT | Mod: ZL | Performed by: NURSE PRACTITIONER

## 2024-06-04 PROCEDURE — 81001 URINALYSIS AUTO W/SCOPE: CPT | Mod: ZL | Performed by: NURSE PRACTITIONER

## 2024-06-04 RX ORDER — LISINOPRIL 40 MG/1
40 TABLET ORAL DAILY
Qty: 30 TABLET | Refills: 2 | Status: SHIPPED | OUTPATIENT
Start: 2024-06-04 | End: 2024-06-19

## 2024-06-04 RX ORDER — LOSARTAN POTASSIUM 25 MG/1
25 TABLET ORAL DAILY
Status: CANCELLED | OUTPATIENT
Start: 2024-06-04

## 2024-06-04 ASSESSMENT — PAIN SCALES - GENERAL: PAINLEVEL: NO PAIN (0)

## 2024-06-04 NOTE — NURSING NOTE
"  Chief Complaint   Patient presents with    Blood Pressure Check     Fluctuating BP     Urinary Frequency       Initial BP (!) 132/98 (BP Location: Right arm, Patient Position: Sitting, Cuff Size: Adult Regular)   Pulse 78   Temp 97.6  F (36.4  C) (Tympanic)   Resp 14   Ht 1.613 m (5' 3.5\")   Wt 66.8 kg (147 lb 3.2 oz)   LMP  (LMP Unknown)   SpO2 100%   Breastfeeding No   BMI 25.67 kg/m   Estimated body mass index is 25.67 kg/m  as calculated from the following:    Height as of this encounter: 1.613 m (5' 3.5\").    Weight as of this encounter: 66.8 kg (147 lb 3.2 oz).  Medication Review: complete    The next two questions are to help us understand your food security.  If you are feeling you need any assistance in this area, we have resources available to support you today.          4/2/2024   SDOH- Food Insecurity   Within the past 12 months, did you worry that your food would run out before you got money to buy more? N   Within the past 12 months, did the food you bought just not last and you didn t have money to get more? N         Health Care Directive:  Patient does not have a Health Care Directive or Living Will: Discussed advance care planning with patient; however, patient declined at this time.    Shayna Sun CMA      "

## 2024-06-04 NOTE — PROGRESS NOTES
"  Assessment & Plan   Problem List Items Addressed This Visit       Essential hypertension    Relevant Medications    lisinopril (ZESTRIL) 40 MG tablet    Other Relevant Orders    Basic Metabolic Panel (Completed)     Other Visit Diagnoses       Urinary frequency    -  Primary    Relevant Orders    UA Macroscopic with reflex to Microscopic and Culture (Completed)    Urine Culture Aerobic Bacterial    Basic Metabolic Panel (Completed)    Elevated C-reactive protein (CRP)        Relevant Orders    C-Reactive Protein, High Sensitivity (CRP)    Familial hyperlipidemia        Relevant Orders    Lipid Panel    Elevated serum GGT level        Relevant Orders    GAMMA GT (GGT)    Elevated ferritin        Relevant Orders    Ferritin    Serum calcium elevated        Relevant Orders    PTH, Intact    Ionized Calcium    25 Hydroxyvitamin D2 and D3           1. Urinary frequency  - UA Macroscopic with reflex to Microscopic and Culture  - Urine Culture Aerobic Bacterial  - Basic Metabolic Panel  Initial urinalysis without evidence of acute cystitis, added on urine culture to assure no infection causing urinary frequency.  I suspect that her full bladder and urinary urgency may be related to recent constipation that seems to be resolving now.  Reassured by normal renal function test.  Will follow urine culture and treat accordingly if there is bacterial growth only.    2. Essential hypertension  - Basic Metabolic Panel  Stable BMP today.  Increased lisinopril dosing to 40 mg daily for tighter blood pressure control.    3. Elevated C-reactive protein (CRP)  - C-Reactive Protein, High Sensitivity (CRP)  Related to [facility] \"executive physical exam\" from January of 2024 with Dr. Rissa Juarez, repeat CRP, lipid profile, GGT and ferritin were recommended in 6 months time due to elevated values. I have placed future orders for these to be drawn sometime in July. I also recommend follow up with PCP to discuss these lab values and " "follow up on hypertension after increase in lisinopril.     4. Familial hyperlipidemia  - Lipid Panel  See #3.    5. Elevated serum GGT level  - GAMMA GT (GGT)    6. Elevated ferritin  - Ferritin    7. Serum calcium elevated  - PTH, Intact  - Ionized Calcium  - 25 Hydroxyvitamin D2 and D3  Will follow results for PTH, ionized calcium, and vitamin D2 in 3 and treat accordingly.  This time unknown cause of elevated calcium but will evaluate further.         BMI  Estimated body mass index is 25.67 kg/m  as calculated from the following:    Height as of this encounter: 1.613 m (5' 3.5\").    Weight as of this encounter: 66.8 kg (147 lb 3.2 oz).   Weight management plan: Discussed healthy diet and exercise guidelines      No follow-ups on file.      Subjective   Mary is a 54 year old, presenting for the following health issues:  Blood Pressure Check (Fluctuating BP ) and Urinary Frequency        6/4/2024     8:56 AM   Additional Questions   Roomed by ALEXANDRO Corral   Accompanied by Self     History of Present Illness       Reason for visit:  Frequent urination. Blood pressure up and down  Symptom onset:  1-2 weeks ago  Symptoms include:  Frquent urination   Unsteady blood pressure  Symptom intensity:  Moderate  Symptom progression:  Worsening  Had these symptoms before:  No  What makes it worse:  No  What makes it better:  No    She eats 2-3 servings of fruits and vegetables daily.She consumes 0 sweetened beverage(s) daily.She exercises with enough effort to increase her heart rate 30 to 60 minutes per day.  She exercises with enough effort to increase her heart rate 3 or less days per week.   She is taking medications regularly.     Mary presents here today with concerns of blood pressures that have been up and down for the past few weeks as well as some urinary urgency/freqency.  She denies any dysuria or hematuria. She feels that her bladder is very full and that she empties quite well when she voids.  Tells me she has " "had some hard stools for the past few days but yesterday she had a softer stool which was normal for her. She does not chronically experience constipation.  She is mainly concerned about her urinary urgency and elevated blood pressure.Denies any fevers, nausea vomiting or feeling unwell.      She has been on lisinopril for quite some time and tolerating this medication well.  She had an executive physical done at [facility] in January.  At that time, it was considered to increase her lisinopril but ultimately they did not feel necessary to make the change at the time.  She is working on lifestyle changes but not successful at lowering her blood pressure with that at this time.  Blood pressures range in the 130s over 90s on average, at times noting blood pressure in the 140s systolically.  She does admit to increased level of stress as of lately.    Review of Systems  Constitutional, HEENT, cardiovascular, pulmonary, GI, , musculoskeletal, neuro, skin, endocrine and psych systems are negative, except as otherwise noted.      Objective    BP (!) 134/90 (BP Location: Right arm, Patient Position: Sitting, Cuff Size: Adult Regular)   Pulse 78   Temp 97.6  F (36.4  C) (Tympanic)   Resp 14   Ht 1.613 m (5' 3.5\")   Wt 66.8 kg (147 lb 3.2 oz)   LMP  (LMP Unknown)   SpO2 100%   Breastfeeding No   BMI 25.67 kg/m    Body mass index is 25.67 kg/m .  Physical Exam  Vitals and nursing note reviewed.   Constitutional:       General: She is not in acute distress.     Appearance: Normal appearance. She is not ill-appearing.   Cardiovascular:      Rate and Rhythm: Normal rate and regular rhythm.      Heart sounds: Normal heart sounds. No murmur heard.     No friction rub. No gallop.   Pulmonary:      Effort: Pulmonary effort is normal. No respiratory distress.      Breath sounds: Normal breath sounds. No stridor. No wheezing, rhonchi or rales.   Chest:      Chest wall: No tenderness.   Abdominal:      General: Abdomen is " flat. Bowel sounds are normal. There is no distension.      Palpations: Abdomen is soft. There is no mass.      Tenderness: There is abdominal tenderness. There is no right CVA tenderness, left CVA tenderness, guarding or rebound.      Hernia: No hernia is present.      Comments: Suprapubic tenderness upon exam.   Musculoskeletal:         General: Normal range of motion.   Skin:     General: Skin is warm and dry.   Neurological:      General: No focal deficit present.      Mental Status: She is alert and oriented to person, place, and time.   Psychiatric:         Mood and Affect: Mood normal.         Behavior: Behavior normal.                Results for orders placed or performed in visit on 06/04/24   UA Macroscopic with reflex to Microscopic and Culture     Status: Abnormal    Specimen: Urine, Clean Catch   Result Value Ref Range    Color Urine Light Yellow Colorless, Straw, Light Yellow, Yellow    Appearance Urine Clear Clear    Glucose Urine Negative Negative mg/dL    Bilirubin Urine Negative Negative    Ketones Urine Negative Negative mg/dL    Specific Gravity Urine 1.015 1.000 - 1.030    Blood Urine Trace (A) Negative    pH Urine 6.0 5.0 - 9.0    Protein Albumin Urine Negative Negative mg/dL    Urobilinogen Urine Normal Normal, 2.0 mg/dL    Nitrite Urine Negative Negative    Leukocyte Esterase Urine Negative Negative    Bacteria Urine Few (A) None Seen /HPF    Mucus Urine Present (A) None Seen /LPF    RBC Urine 1 <=2 /HPF    WBC Urine 1 <=5 /HPF    Narrative    Urine Culture not indicated   Basic Metabolic Panel     Status: Abnormal   Result Value Ref Range    Sodium 139 135 - 145 mmol/L    Potassium 4.7 3.4 - 5.3 mmol/L    Chloride 101 98 - 107 mmol/L    Carbon Dioxide (CO2) 27 22 - 29 mmol/L    Anion Gap 11 7 - 15 mmol/L    Urea Nitrogen 21.9 (H) 6.0 - 20.0 mg/dL    Creatinine 0.86 0.51 - 0.95 mg/dL    GFR Estimate 81 >60 mL/min/1.73m2    Calcium 10.3 (H) 8.6 - 10.0 mg/dL    Glucose 96 70 - 99 mg/dL          Signed Electronically by: Shayna Hills NP

## 2024-06-06 ENCOUNTER — MYC MEDICAL ADVICE (OUTPATIENT)
Dept: FAMILY MEDICINE | Facility: OTHER | Age: 54
End: 2024-06-06

## 2024-06-06 ENCOUNTER — LAB (OUTPATIENT)
Dept: LAB | Facility: OTHER | Age: 54
End: 2024-06-06
Attending: FAMILY MEDICINE
Payer: COMMERCIAL

## 2024-06-06 DIAGNOSIS — E83.52 SERUM CALCIUM ELEVATED: ICD-10-CM

## 2024-06-06 DIAGNOSIS — R74.8 ELEVATED SERUM GGT LEVEL: ICD-10-CM

## 2024-06-06 DIAGNOSIS — R79.89 ELEVATED FERRITIN: ICD-10-CM

## 2024-06-06 DIAGNOSIS — E78.49 FAMILIAL HYPERLIPIDEMIA: ICD-10-CM

## 2024-06-06 DIAGNOSIS — R79.82 ELEVATED C-REACTIVE PROTEIN (CRP): ICD-10-CM

## 2024-06-06 LAB
BACTERIA UR CULT: NO GROWTH
CA-I BLD-MCNC: 5.1 MG/DL (ref 4.4–5.2)
CHOLEST SERPL-MCNC: 183 MG/DL
FASTING STATUS PATIENT QL REPORTED: YES
FERRITIN SERPL-MCNC: 167 NG/ML (ref 11–328)
HDLC SERPL-MCNC: 55 MG/DL
LDLC SERPL CALC-MCNC: 101 MG/DL
NONHDLC SERPL-MCNC: 128 MG/DL
TRIGL SERPL-MCNC: 133 MG/DL

## 2024-06-06 PROCEDURE — 82465 ASSAY BLD/SERUM CHOLESTEROL: CPT | Mod: ZL

## 2024-06-06 PROCEDURE — 86141 C-REACTIVE PROTEIN HS: CPT | Mod: ZL

## 2024-06-06 PROCEDURE — 82306 VITAMIN D 25 HYDROXY: CPT | Mod: ZL

## 2024-06-06 PROCEDURE — 82330 ASSAY OF CALCIUM: CPT | Mod: ZL

## 2024-06-06 PROCEDURE — 83970 ASSAY OF PARATHORMONE: CPT | Mod: ZL

## 2024-06-06 PROCEDURE — 82977 ASSAY OF GGT: CPT | Mod: ZL

## 2024-06-06 PROCEDURE — 36415 COLL VENOUS BLD VENIPUNCTURE: CPT | Mod: ZL

## 2024-06-06 PROCEDURE — 82728 ASSAY OF FERRITIN: CPT | Mod: ZL

## 2024-06-07 LAB
CRP SERPL HS-MCNC: 1.6 MG/L
GGT SERPL-CCNC: 38 U/L (ref 5–36)
PTH-INTACT SERPL-MCNC: 26 PG/ML (ref 15–65)

## 2024-06-10 LAB
DEPRECATED CALCIDIOL+CALCIFEROL SERPL-MC: <53 UG/L (ref 20–75)
VITAMIN D2 SERPL-MCNC: <5 UG/L
VITAMIN D3 SERPL-MCNC: 48 UG/L

## 2024-06-19 ENCOUNTER — MYC MEDICAL ADVICE (OUTPATIENT)
Dept: FAMILY MEDICINE | Facility: OTHER | Age: 54
End: 2024-06-19
Payer: COMMERCIAL

## 2024-06-19 DIAGNOSIS — I10 ESSENTIAL HYPERTENSION: ICD-10-CM

## 2024-06-19 RX ORDER — LISINOPRIL 20 MG/1
30 TABLET ORAL DAILY
Qty: 135 TABLET | Refills: 4 | Status: SHIPPED | OUTPATIENT
Start: 2024-06-19

## 2024-08-15 ENCOUNTER — MYC MEDICAL ADVICE (OUTPATIENT)
Dept: FAMILY MEDICINE | Facility: OTHER | Age: 54
End: 2024-08-15
Payer: COMMERCIAL

## 2024-08-15 DIAGNOSIS — R74.8 ELEVATED SERUM GGT LEVEL: Primary | ICD-10-CM

## 2024-08-15 DIAGNOSIS — R79.89 ELEVATED FERRITIN: ICD-10-CM

## 2024-08-15 DIAGNOSIS — E78.2 MIXED HYPERLIPIDEMIA: ICD-10-CM

## 2024-08-15 DIAGNOSIS — R79.82 ELEVATED C-REACTIVE PROTEIN (CRP): ICD-10-CM

## 2024-08-15 DIAGNOSIS — I10 ESSENTIAL HYPERTENSION: ICD-10-CM

## 2024-08-29 ASSESSMENT — PAIN SCALES - PAIN ENJOYMENT GENERAL ACTIVITY SCALE (PEG)
INTERFERED_GENERAL_ACTIVITY: 2
AVG_PAIN_PASTWEEK: 3
INTERFERED_ENJOYMENT_LIFE: 3
PEG_TOTALSCORE: 2.67

## 2024-08-30 ENCOUNTER — OFFICE VISIT (OUTPATIENT)
Dept: INTERNAL MEDICINE | Facility: OTHER | Age: 54
End: 2024-08-30
Attending: INTERNAL MEDICINE
Payer: COMMERCIAL

## 2024-08-30 ENCOUNTER — HOSPITAL ENCOUNTER (OUTPATIENT)
Dept: GENERAL RADIOLOGY | Facility: OTHER | Age: 54
Discharge: HOME | End: 2024-08-30
Attending: INTERNAL MEDICINE
Payer: COMMERCIAL

## 2024-08-30 VITALS
SYSTOLIC BLOOD PRESSURE: 128 MMHG | HEART RATE: 100 BPM | HEIGHT: 63 IN | RESPIRATION RATE: 18 BRPM | DIASTOLIC BLOOD PRESSURE: 78 MMHG | WEIGHT: 143 LBS | BODY MASS INDEX: 25.34 KG/M2 | OXYGEN SATURATION: 98 %

## 2024-08-30 DIAGNOSIS — S93.491A HIGH ANKLE SPRAIN OF RIGHT LOWER EXTREMITY, INITIAL ENCOUNTER: ICD-10-CM

## 2024-08-30 DIAGNOSIS — S99.911A ANKLE INJURY, RIGHT, INITIAL ENCOUNTER: Primary | ICD-10-CM

## 2024-08-30 DIAGNOSIS — M79.671 RIGHT FOOT PAIN: ICD-10-CM

## 2024-08-30 DIAGNOSIS — S99.911A ANKLE INJURY, RIGHT, INITIAL ENCOUNTER: ICD-10-CM

## 2024-08-30 PROCEDURE — 73610 X-RAY EXAM OF ANKLE: CPT | Mod: RT

## 2024-08-30 PROCEDURE — 99213 OFFICE O/P EST LOW 20 MIN: CPT | Performed by: INTERNAL MEDICINE

## 2024-08-30 PROCEDURE — 73630 X-RAY EXAM OF FOOT: CPT | Mod: RT

## 2024-08-30 ASSESSMENT — PAIN SCALES - GENERAL: PAINLEVEL: MILD PAIN (2)
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
## 2024-08-30 NOTE — PATIENT INSTRUCTIONS
No obvious acute fractures....     Old Medial right ankle malleolus avulsion fracture.  Appears to be old, the edges have been rounded.    Possible mild injury, possible avulsion fracture of the proximal fifth metatarsal.  No significant bony injury.      Recommend gentle walking, routine daily activities rather than any extra exercise at this time until foot pain has resolved.    Consider hard-bottom shoes... try to walk with shoes on - avoid bare foot for now.       Return as needed for follow-up for new / worsening symptoms.    Clinic : [phone]  Appointment line: [phone]

## 2024-08-30 NOTE — NURSING NOTE
"Chief Complaint   Patient presents with    Musculoskeletal Problem       Initial /78   Pulse 100   Resp 18   Ht 1.6 m (5' 3\")   Wt 64.9 kg (143 lb)   LMP  (LMP Unknown)   SpO2 98%   Breastfeeding No   BMI 25.33 kg/m   Estimated body mass index is 25.33 kg/m  as calculated from the following:    Height as of this encounter: 1.6 m (5' 3\").    Weight as of this encounter: 64.9 kg (143 lb).  Medication Review: complete    The next two questions are to help us understand your food security.  If you are feeling you need any assistance in this area, we have resources available to support you today.          4/2/2024   SDOH- Food Insecurity   Within the past 12 months, did you worry that your food would run out before you got money to buy more? N   Within the past 12 months, did the food you bought just not last and you didn t have money to get more? N            Health Care Directive:  Patient does not have a Health Care Directive or Living Will: Discussed advance care planning with patient; however, patient declined at this time.    Iza Todd LPN      "

## 2024-09-05 ENCOUNTER — LAB (OUTPATIENT)
Dept: LAB | Facility: OTHER | Age: 54
End: 2024-09-05
Attending: FAMILY MEDICINE
Payer: COMMERCIAL

## 2024-09-05 DIAGNOSIS — R74.8 ELEVATED SERUM GGT LEVEL: ICD-10-CM

## 2024-09-05 LAB
ALBUMIN SERPL BCG-MCNC: 4.8 G/DL (ref 3.5–5.2)
ALP SERPL-CCNC: 68 U/L (ref 40–150)
ALT SERPL W P-5'-P-CCNC: 24 U/L (ref 0–50)
AST SERPL W P-5'-P-CCNC: 26 U/L (ref 0–45)
BILIRUB DIRECT SERPL-MCNC: <0.2 MG/DL (ref 0–0.3)
BILIRUB SERPL-MCNC: 0.5 MG/DL
CHOLEST SERPL-MCNC: 167 MG/DL
FASTING STATUS PATIENT QL REPORTED: YES
FERRITIN SERPL-MCNC: 183 NG/ML (ref 11–328)
GGT SERPL-CCNC: 33 U/L (ref 5–36)
HDLC SERPL-MCNC: 53 MG/DL
LDLC SERPL CALC-MCNC: 85 MG/DL
NONHDLC SERPL-MCNC: 114 MG/DL
PROT SERPL-MCNC: 7.5 G/DL (ref 6.4–8.3)
TRIGL SERPL-MCNC: 145 MG/DL

## 2024-09-05 PROCEDURE — 82728 ASSAY OF FERRITIN: CPT | Mod: ZL | Performed by: FAMILY MEDICINE

## 2024-09-05 PROCEDURE — 36415 COLL VENOUS BLD VENIPUNCTURE: CPT | Mod: ZL | Performed by: FAMILY MEDICINE

## 2024-09-05 PROCEDURE — 82977 ASSAY OF GGT: CPT | Mod: ZL | Performed by: FAMILY MEDICINE

## 2024-09-05 PROCEDURE — 80061 LIPID PANEL: CPT | Mod: ZL | Performed by: FAMILY MEDICINE

## 2024-09-05 PROCEDURE — 80076 HEPATIC FUNCTION PANEL: CPT | Mod: ZL

## 2025-01-02 DIAGNOSIS — E78.2 MIXED HYPERLIPIDEMIA: Primary | ICD-10-CM

## 2025-01-02 NOTE — TELEPHONE ENCOUNTER
Crestor 20 mg tab      Last Written Prescription Date:  historical   Last Fill Quantity: 0,   # refills: 0  Last Office Visit: 6-4-24

## 2025-01-04 NOTE — TELEPHONE ENCOUNTER
[facility] sent Rx request for the following:      Requested Prescriptions   Pending Prescriptions Disp Refills    rosuvastatin (CRESTOR) 20 MG tablet [Pharmacy Med Name: rosuvastatin 20 mg tablet] 90 tablet 3     Sig: Take 1 tablet (20 mg total) by mouth daily.       Antihyperlipidemic agents Passed - 1/4/2025 10:48 AM       Last Prescription Date:   noted historical  Last Fill Qty/Refills:         , R-    Last Office Visit:              6/4/24   Future Office visit:                Routing refill request to provider for review/approval because:  Medication is reported/historical    Pamela Reeves RN on 1/4/2025 at 10:49 AM

## 2025-01-05 ENCOUNTER — MYC REFILL (OUTPATIENT)
Dept: FAMILY MEDICINE | Facility: OTHER | Age: 55
End: 2025-01-05
Payer: COMMERCIAL

## 2025-01-06 RX ORDER — ROSUVASTATIN CALCIUM 20 MG/1
20 TABLET, COATED ORAL DAILY
Qty: 90 TABLET | Refills: 1 | Status: SHIPPED | OUTPATIENT
Start: 2025-01-06

## 2025-01-07 RX ORDER — ROSUVASTATIN CALCIUM 20 MG/1
20 TABLET, COATED ORAL DAILY
OUTPATIENT
Start: 2025-01-07

## 2025-02-17 ENCOUNTER — MYC MEDICAL ADVICE (OUTPATIENT)
Dept: FAMILY MEDICINE | Facility: OTHER | Age: 55
End: 2025-02-17
Payer: COMMERCIAL

## 2025-02-17 DIAGNOSIS — G44.219 EPISODIC TENSION-TYPE HEADACHE, NOT INTRACTABLE: ICD-10-CM

## 2025-02-18 RX ORDER — CYCLOBENZAPRINE HCL 10 MG
10 TABLET ORAL 3 TIMES DAILY PRN
Qty: 90 TABLET | Refills: 1 | Status: SHIPPED | OUTPATIENT
Start: 2025-02-18

## 2025-02-18 NOTE — TELEPHONE ENCOUNTER
Requested Prescriptions   Pending Prescriptions Disp Refills    cyclobenzaprine (FLEXERIL) 10 MG tablet 90 tablet 1     Sig: Take 1 tablet (10 mg) by mouth 3 times daily as needed for muscle spasms.       There is no refill protocol information for this order          Last Prescription Date:   8/14/23  Last Fill Qty/Refills:         90, R-1    Last Office Visit:              3/22/23 where med was discussed, LOV was 8/30/24  Future Office visit:            None    Routing to provider to review and respond.  Odilia Lopez RN on 2/18/2025 at 8:17 AM

## 2025-04-30 ENCOUNTER — PATIENT OUTREACH (OUTPATIENT)
Dept: CARE COORDINATION | Facility: CLINIC | Age: 55
End: 2025-04-30
Payer: COMMERCIAL

## 2025-07-01 ENCOUNTER — MYC REFILL (OUTPATIENT)
Dept: FAMILY MEDICINE | Facility: OTHER | Age: 55
End: 2025-07-01
Payer: COMMERCIAL

## 2025-07-01 DIAGNOSIS — E78.2 MIXED HYPERLIPIDEMIA: ICD-10-CM

## 2025-07-02 DIAGNOSIS — E78.2 MIXED HYPERLIPIDEMIA: ICD-10-CM

## 2025-07-03 RX ORDER — ROSUVASTATIN CALCIUM 20 MG/1
20 TABLET, COATED ORAL DAILY
Qty: 90 TABLET | Refills: 1 | OUTPATIENT
Start: 2025-07-03

## 2025-07-04 ENCOUNTER — MYC MEDICAL ADVICE (OUTPATIENT)
Dept: FAMILY MEDICINE | Facility: OTHER | Age: 55
End: 2025-07-04
Payer: COMMERCIAL

## 2025-07-04 DIAGNOSIS — E78.2 MIXED HYPERLIPIDEMIA: ICD-10-CM

## 2025-07-04 DIAGNOSIS — I10 ESSENTIAL HYPERTENSION: ICD-10-CM

## 2025-07-07 RX ORDER — LISINOPRIL 20 MG/1
30 TABLET ORAL DAILY
Qty: 135 TABLET | Refills: 4 | Status: SHIPPED | OUTPATIENT
Start: 2025-07-07

## 2025-07-07 RX ORDER — ROSUVASTATIN CALCIUM 20 MG/1
20 TABLET, COATED ORAL DAILY
Qty: 90 TABLET | Refills: 3 | Status: SHIPPED | OUTPATIENT
Start: 2025-07-07

## 2025-07-07 NOTE — TELEPHONE ENCOUNTER
Needs refills on Lisinopril and Crestor.     Has physical scheduled at [facility] the first week of October.    LOV with PCP was 3/22/23    Cristopher'd up both orders.     Routing to provider to review and respond.  Odilia Lopez RN on 7/7/2025 at 8:11 AM

## 2025-07-08 RX ORDER — ROSUVASTATIN CALCIUM 20 MG/1
20 TABLET, COATED ORAL DAILY
Qty: 90 TABLET | Refills: 1 | OUTPATIENT
Start: 2025-07-08

## 2025-07-08 NOTE — TELEPHONE ENCOUNTER
Prescription refused.  This is a duplicate request.       Requested Prescriptions   Pending Prescriptions Disp Refills    rosuvastatin (CRESTOR) 20 MG tablet 90 tablet 1     Sig: Take 1 tablet (20 mg) by mouth daily.       Antihyperlipidemic agents Failed - 7/8/2025  6:19 AM        Failed - Recent (12 month) or future (90 days) visit with authorizing provider's specialty (provided they have been seen in the past 15 months)     The patient must have completed an in-person or virtual visit within the past 12 months or has a future visit scheduled within the next 90 days with the authorizing provider s specialty.  Urgent care and e-visits do not qualify as an office visit for this protocol.       Last Prescription Date:   07/07/2025  Last Fill Qty/Refills:         90, R-3    Mary Enrique RN on 7/8/2025 at 6:22 AM

## (undated) RX ORDER — CEFTRIAXONE SODIUM 1 G
VIAL (EA) INJECTION
Status: DISPENSED
Start: 2018-07-26

## (undated) RX ORDER — LIDOCAINE HYDROCHLORIDE 10 MG/ML
INJECTION, SOLUTION EPIDURAL; INFILTRATION; INTRACAUDAL; PERINEURAL
Status: DISPENSED
Start: 2018-07-26

## (undated) RX ORDER — GINSENG 100 MG
CAPSULE ORAL
Status: DISPENSED
Start: 2024-01-17

## (undated) RX ORDER — SODIUM CHLORIDE 9 MG/ML
INJECTION, SOLUTION INTRAVENOUS
Status: DISPENSED
Start: 2018-07-29